# Patient Record
Sex: MALE | Race: WHITE | URBAN - METROPOLITAN AREA
[De-identification: names, ages, dates, MRNs, and addresses within clinical notes are randomized per-mention and may not be internally consistent; named-entity substitution may affect disease eponyms.]

---

## 2017-08-09 ENCOUNTER — OFFICE (OUTPATIENT)
Dept: URBAN - METROPOLITAN AREA CLINIC 33 | Facility: CLINIC | Age: 62
End: 2017-08-09

## 2017-08-09 PROCEDURE — 00031: CPT

## 2017-08-22 ENCOUNTER — OFFICE (OUTPATIENT)
Dept: URBAN - METROPOLITAN AREA CLINIC 32 | Facility: CLINIC | Age: 62
End: 2017-08-22
Payer: COMMERCIAL

## 2017-08-22 VITALS
OXYGEN SATURATION: 93 % | SYSTOLIC BLOOD PRESSURE: 121 MMHG | TEMPERATURE: 98.1 F | DIASTOLIC BLOOD PRESSURE: 86 MMHG | SYSTOLIC BLOOD PRESSURE: 127 MMHG | RESPIRATION RATE: 12 BRPM | HEART RATE: 70 BPM | DIASTOLIC BLOOD PRESSURE: 87 MMHG | HEART RATE: 65 BPM | TEMPERATURE: 97.5 F | OXYGEN SATURATION: 94 % | RESPIRATION RATE: 14 BRPM | HEIGHT: 67 IN | SYSTOLIC BLOOD PRESSURE: 176 MMHG | WEIGHT: 230 LBS | DIASTOLIC BLOOD PRESSURE: 96 MMHG

## 2017-08-22 DIAGNOSIS — K63.5 POLYP OF COLON: ICD-10-CM

## 2017-08-22 DIAGNOSIS — Z12.11 ENCOUNTER FOR SCREENING FOR MALIGNANT NEOPLASM OF COLON: ICD-10-CM

## 2017-08-22 DIAGNOSIS — D12.3 BENIGN NEOPLASM OF TRANSVERSE COLON: ICD-10-CM

## 2017-08-22 PROBLEM — D12.4 BENIGN NEOPLASM OF DESCENDING COLON: Status: ACTIVE | Noted: 2017-08-22

## 2017-08-22 PROBLEM — K64.0 FIRST DEGREE HEMORRHOIDS: Status: ACTIVE | Noted: 2017-08-22

## 2017-08-22 PROCEDURE — 00810: CPT | Mod: QS,AA,P3

## 2017-08-22 PROCEDURE — 45380 COLONOSCOPY AND BIOPSY: CPT | Mod: 59

## 2017-08-22 PROCEDURE — 45385 COLONOSCOPY W/LESION REMOVAL: CPT

## 2017-08-22 PROCEDURE — 00810: CPT | Mod: AA,P3,QS

## 2021-08-29 ENCOUNTER — APPOINTMENT (OUTPATIENT)
Dept: CT IMAGING | Age: 66
DRG: 378 | End: 2021-08-29
Attending: EMERGENCY MEDICINE
Payer: MEDICARE

## 2021-08-29 ENCOUNTER — HOSPITAL ENCOUNTER (INPATIENT)
Age: 66
LOS: 3 days | Discharge: HOME | DRG: 378 | End: 2021-09-01
Attending: EMERGENCY MEDICINE | Admitting: INTERNAL MEDICINE
Payer: MEDICARE

## 2021-08-29 DIAGNOSIS — K92.2 GASTROINTESTINAL HEMORRHAGE, UNSPECIFIED GASTROINTESTINAL HEMORRHAGE TYPE: Primary | ICD-10-CM

## 2021-08-29 PROBLEM — I10 HTN (HYPERTENSION): Status: ACTIVE | Noted: 2021-08-29

## 2021-08-29 PROBLEM — E78.5 HYPERLIPIDEMIA: Status: ACTIVE | Noted: 2021-08-29

## 2021-08-29 PROBLEM — D72.829 LEUKOCYTOSIS: Status: ACTIVE | Noted: 2021-08-29

## 2021-08-29 PROBLEM — E87.5 HYPERKALEMIA: Status: ACTIVE | Noted: 2021-08-29

## 2021-08-29 PROBLEM — I25.10 CAD (CORONARY ARTERY DISEASE): Status: ACTIVE | Noted: 2021-08-29

## 2021-08-29 LAB
ALBUMIN SERPL-MCNC: 3.2 G/DL (ref 3.5–5)
ALBUMIN/GLOB SERPL: 1.1 {RATIO} (ref 1.1–2.2)
ALP SERPL-CCNC: 55 U/L (ref 45–117)
ALT SERPL-CCNC: 21 U/L (ref 12–78)
ANION GAP SERPL CALC-SCNC: 6 MMOL/L (ref 5–15)
AST SERPL-CCNC: 32 U/L (ref 15–37)
BASOPHILS # BLD: 0.1 K/UL (ref 0–0.1)
BASOPHILS NFR BLD: 1 % (ref 0–1)
BILIRUB SERPL-MCNC: 0.7 MG/DL (ref 0.2–1)
BUN SERPL-MCNC: 33 MG/DL (ref 6–20)
BUN/CREAT SERPL: 28 (ref 12–20)
CALCIUM SERPL-MCNC: 7.8 MG/DL (ref 8.5–10.1)
CHLORIDE SERPL-SCNC: 111 MMOL/L (ref 97–108)
CO2 SERPL-SCNC: 23 MMOL/L (ref 21–32)
CREAT SERPL-MCNC: 1.17 MG/DL (ref 0.7–1.3)
DIFFERENTIAL METHOD BLD: ABNORMAL
EOSINOPHIL # BLD: 0.7 K/UL (ref 0–0.4)
EOSINOPHIL NFR BLD: 4 % (ref 0–7)
ERYTHROCYTE [DISTWIDTH] IN BLOOD BY AUTOMATED COUNT: 13.5 % (ref 11.5–14.5)
GLOBULIN SER CALC-MCNC: 2.8 G/DL (ref 2–4)
GLUCOSE SERPL-MCNC: 143 MG/DL (ref 65–100)
HCT VFR BLD AUTO: 40.2 % (ref 36.6–50.3)
HGB BLD-MCNC: 13.5 G/DL (ref 12.1–17)
IMM GRANULOCYTES # BLD AUTO: 0.1 K/UL (ref 0–0.04)
IMM GRANULOCYTES NFR BLD AUTO: 1 % (ref 0–0.5)
INR PPP: 1.1 (ref 0.9–1.1)
LYMPHOCYTES # BLD: 3.3 K/UL (ref 0.8–3.5)
LYMPHOCYTES NFR BLD: 19 % (ref 12–49)
MCH RBC QN AUTO: 31.2 PG (ref 26–34)
MCHC RBC AUTO-ENTMCNC: 33.6 G/DL (ref 30–36.5)
MCV RBC AUTO: 92.8 FL (ref 80–99)
MONOCYTES # BLD: 1.1 K/UL (ref 0–1)
MONOCYTES NFR BLD: 7 % (ref 5–13)
NEUTS SEG # BLD: 11.7 K/UL (ref 1.8–8)
NEUTS SEG NFR BLD: 68 % (ref 32–75)
NRBC # BLD: 0 K/UL (ref 0–0.01)
NRBC BLD-RTO: 0 PER 100 WBC
PLATELET # BLD AUTO: 303 K/UL (ref 150–400)
PMV BLD AUTO: 11.3 FL (ref 8.9–12.9)
POTASSIUM SERPL-SCNC: 5.5 MMOL/L (ref 3.5–5.1)
PROT SERPL-MCNC: 6 G/DL (ref 6.4–8.2)
PROTHROMBIN TIME: 11.7 SEC (ref 9–11.1)
RBC # BLD AUTO: 4.33 M/UL (ref 4.1–5.7)
SODIUM SERPL-SCNC: 140 MMOL/L (ref 136–145)
WBC # BLD AUTO: 17 K/UL (ref 4.1–11.1)

## 2021-08-29 PROCEDURE — 65660000000 HC RM CCU STEPDOWN

## 2021-08-29 PROCEDURE — 86920 COMPATIBILITY TEST SPIN: CPT

## 2021-08-29 PROCEDURE — 99285 EMERGENCY DEPT VISIT HI MDM: CPT

## 2021-08-29 PROCEDURE — 86923 COMPATIBILITY TEST ELECTRIC: CPT

## 2021-08-29 PROCEDURE — 86901 BLOOD TYPING SEROLOGIC RH(D): CPT

## 2021-08-29 PROCEDURE — 36415 COLL VENOUS BLD VENIPUNCTURE: CPT

## 2021-08-29 PROCEDURE — P9016 RBC LEUKOCYTES REDUCED: HCPCS

## 2021-08-29 PROCEDURE — 85025 COMPLETE CBC W/AUTO DIFF WBC: CPT

## 2021-08-29 PROCEDURE — 96365 THER/PROPH/DIAG IV INF INIT: CPT

## 2021-08-29 PROCEDURE — 30233N1 TRANSFUSION OF NONAUTOLOGOUS RED BLOOD CELLS INTO PERIPHERAL VEIN, PERCUTANEOUS APPROACH: ICD-10-PCS | Performed by: EMERGENCY MEDICINE

## 2021-08-29 PROCEDURE — 74011000636 HC RX REV CODE- 636: Performed by: EMERGENCY MEDICINE

## 2021-08-29 PROCEDURE — 74011250636 HC RX REV CODE- 250/636: Performed by: EMERGENCY MEDICINE

## 2021-08-29 PROCEDURE — 74011000258 HC RX REV CODE- 258: Performed by: EMERGENCY MEDICINE

## 2021-08-29 PROCEDURE — 36430 TRANSFUSION BLD/BLD COMPNT: CPT

## 2021-08-29 PROCEDURE — 74011000250 HC RX REV CODE- 250: Performed by: EMERGENCY MEDICINE

## 2021-08-29 PROCEDURE — 93005 ELECTROCARDIOGRAM TRACING: CPT

## 2021-08-29 PROCEDURE — 30233K1 TRANSFUSION OF NONAUTOLOGOUS FROZEN PLASMA INTO PERIPHERAL VEIN, PERCUTANEOUS APPROACH: ICD-10-PCS | Performed by: EMERGENCY MEDICINE

## 2021-08-29 PROCEDURE — P9059 PLASMA, FRZ BETWEEN 8-24HOUR: HCPCS

## 2021-08-29 PROCEDURE — 80053 COMPREHEN METABOLIC PANEL: CPT

## 2021-08-29 PROCEDURE — 74177 CT ABD & PELVIS W/CONTRAST: CPT

## 2021-08-29 PROCEDURE — 85610 PROTHROMBIN TIME: CPT

## 2021-08-29 RX ORDER — ONDANSETRON 2 MG/ML
4 INJECTION INTRAMUSCULAR; INTRAVENOUS
Status: COMPLETED | OUTPATIENT
Start: 2021-08-29 | End: 2021-08-29

## 2021-08-29 RX ORDER — SODIUM CHLORIDE 9 MG/ML
250 INJECTION, SOLUTION INTRAVENOUS AS NEEDED
Status: DISCONTINUED | OUTPATIENT
Start: 2021-08-29 | End: 2021-09-01 | Stop reason: HOSPADM

## 2021-08-29 RX ORDER — TRANEXAMIC ACID 100 MG/ML
1 INJECTION, SOLUTION INTRAVENOUS ONCE
Status: DISCONTINUED | OUTPATIENT
Start: 2021-08-29 | End: 2021-08-29

## 2021-08-29 RX ADMIN — ONDANSETRON 4 MG: 2 INJECTION INTRAMUSCULAR; INTRAVENOUS at 23:37

## 2021-08-29 RX ADMIN — IOPAMIDOL 100 ML: 755 INJECTION, SOLUTION INTRAVENOUS at 22:41

## 2021-08-29 RX ADMIN — TRANEXAMIC ACID 1000 MG: 1 INJECTION, SOLUTION INTRAVENOUS at 22:11

## 2021-08-30 LAB
ANION GAP SERPL CALC-SCNC: 4 MMOL/L (ref 5–15)
BASOPHILS # BLD: 0 K/UL (ref 0–0.1)
BASOPHILS NFR BLD: 0 % (ref 0–1)
BLD PROD TYP BPU: NORMAL
BLD PROD TYP BPU: NORMAL
BPU ID: NORMAL
BPU ID: NORMAL
BUN SERPL-MCNC: 34 MG/DL (ref 6–20)
BUN/CREAT SERPL: 45 (ref 12–20)
CALCIUM SERPL-MCNC: 7.7 MG/DL (ref 8.5–10.1)
CHLORIDE SERPL-SCNC: 114 MMOL/L (ref 97–108)
CO2 SERPL-SCNC: 24 MMOL/L (ref 21–32)
COVID-19 RAPID TEST, COVR: NOT DETECTED
CREAT SERPL-MCNC: 0.76 MG/DL (ref 0.7–1.3)
DIFFERENTIAL METHOD BLD: ABNORMAL
EOSINOPHIL # BLD: 0 K/UL (ref 0–0.4)
EOSINOPHIL NFR BLD: 0 % (ref 0–7)
ERYTHROCYTE [DISTWIDTH] IN BLOOD BY AUTOMATED COUNT: 13.6 % (ref 11.5–14.5)
GLUCOSE SERPL-MCNC: 104 MG/DL (ref 65–100)
HCT VFR BLD AUTO: 36 % (ref 36.6–50.3)
HCT VFR BLD AUTO: 36.1 % (ref 36.6–50.3)
HCT VFR BLD AUTO: 38.3 % (ref 36.6–50.3)
HGB BLD-MCNC: 12.2 G/DL (ref 12.1–17)
HGB BLD-MCNC: 12.5 G/DL (ref 12.1–17)
HGB BLD-MCNC: 13.1 G/DL (ref 12.1–17)
IMM GRANULOCYTES # BLD AUTO: 0 K/UL
IMM GRANULOCYTES NFR BLD AUTO: 0 %
LYMPHOCYTES # BLD: 0.7 K/UL (ref 0.8–3.5)
LYMPHOCYTES NFR BLD: 4 % (ref 12–49)
MCH RBC QN AUTO: 30.8 PG (ref 26–34)
MCHC RBC AUTO-ENTMCNC: 34.2 G/DL (ref 30–36.5)
MCV RBC AUTO: 89.9 FL (ref 80–99)
MONOCYTES # BLD: 0 K/UL (ref 0–1)
MONOCYTES NFR BLD: 0 % (ref 5–13)
MYELOCYTES NFR BLD MANUAL: 1 %
NEUTS BAND NFR BLD MANUAL: 3 % (ref 0–6)
NEUTS SEG # BLD: 15.9 K/UL (ref 1.8–8)
NEUTS SEG NFR BLD: 92 % (ref 32–75)
NRBC # BLD: 0 K/UL (ref 0–0.01)
NRBC BLD-RTO: 0 PER 100 WBC
PLATELET # BLD AUTO: 211 K/UL (ref 150–400)
PMV BLD AUTO: 11.4 FL (ref 8.9–12.9)
POTASSIUM SERPL-SCNC: 5.6 MMOL/L (ref 3.5–5.1)
RBC # BLD AUTO: 4.26 M/UL (ref 4.1–5.7)
RBC MORPH BLD: ABNORMAL
SODIUM SERPL-SCNC: 142 MMOL/L (ref 136–145)
SOURCE, COVRS: NORMAL
STATUS OF UNIT,%ST: NORMAL
STATUS OF UNIT,%ST: NORMAL
UNIT DIVISION, %UDIV: 0
UNIT DIVISION, %UDIV: 0
WBC # BLD AUTO: 16.7 K/UL (ref 4.1–11.1)

## 2021-08-30 PROCEDURE — 36415 COLL VENOUS BLD VENIPUNCTURE: CPT

## 2021-08-30 PROCEDURE — 74011250637 HC RX REV CODE- 250/637: Performed by: INTERNAL MEDICINE

## 2021-08-30 PROCEDURE — 65660000000 HC RM CCU STEPDOWN

## 2021-08-30 PROCEDURE — 85025 COMPLETE CBC W/AUTO DIFF WBC: CPT

## 2021-08-30 PROCEDURE — 74011250636 HC RX REV CODE- 250/636: Performed by: INTERNAL MEDICINE

## 2021-08-30 PROCEDURE — 85014 HEMATOCRIT: CPT

## 2021-08-30 PROCEDURE — C9113 INJ PANTOPRAZOLE SODIUM, VIA: HCPCS | Performed by: EMERGENCY MEDICINE

## 2021-08-30 PROCEDURE — 80048 BASIC METABOLIC PNL TOTAL CA: CPT

## 2021-08-30 PROCEDURE — 74011250636 HC RX REV CODE- 250/636: Performed by: EMERGENCY MEDICINE

## 2021-08-30 PROCEDURE — 87635 SARS-COV-2 COVID-19 AMP PRB: CPT

## 2021-08-30 PROCEDURE — 74011000250 HC RX REV CODE- 250: Performed by: INTERNAL MEDICINE

## 2021-08-30 RX ORDER — GLUCOSAM/CHONDRO/HERB 149/HYAL 750-100 MG
1 TABLET ORAL DAILY
COMMUNITY

## 2021-08-30 RX ORDER — ASPIRIN 81 MG/1
81 TABLET ORAL DAILY
COMMUNITY
End: 2021-09-01

## 2021-08-30 RX ORDER — CALCIUM CARBONATE 500(1250)
1 TABLET ORAL
COMMUNITY

## 2021-08-30 RX ORDER — BISMUTH SUBSALICYLATE 262 MG
1 TABLET,CHEWABLE ORAL DAILY
COMMUNITY

## 2021-08-30 RX ORDER — SODIUM CHLORIDE 9 MG/ML
75 INJECTION, SOLUTION INTRAVENOUS CONTINUOUS
Status: DISCONTINUED | OUTPATIENT
Start: 2021-08-30 | End: 2021-09-01

## 2021-08-30 RX ORDER — SODIUM CHLORIDE 0.9 % (FLUSH) 0.9 %
5-40 SYRINGE (ML) INJECTION EVERY 8 HOURS
Status: DISCONTINUED | OUTPATIENT
Start: 2021-08-30 | End: 2021-09-01 | Stop reason: HOSPADM

## 2021-08-30 RX ORDER — SODIUM POLYSTYRENE SULFONATE 15 G/60ML
15 SUSPENSION ORAL; RECTAL
Status: COMPLETED | OUTPATIENT
Start: 2021-08-30 | End: 2021-08-30

## 2021-08-30 RX ORDER — BISACODYL 5 MG
10 TABLET, DELAYED RELEASE (ENTERIC COATED) ORAL
Status: COMPLETED | OUTPATIENT
Start: 2021-08-30 | End: 2021-08-30

## 2021-08-30 RX ORDER — SODIUM CHLORIDE 0.9 % (FLUSH) 0.9 %
5-40 SYRINGE (ML) INJECTION AS NEEDED
Status: DISCONTINUED | OUTPATIENT
Start: 2021-08-30 | End: 2021-09-01 | Stop reason: HOSPADM

## 2021-08-30 RX ORDER — ROSUVASTATIN CALCIUM 10 MG/1
10 TABLET, COATED ORAL DAILY
COMMUNITY

## 2021-08-30 RX ORDER — CHOLECALCIFEROL (VITAMIN D3) 125 MCG
2000 CAPSULE ORAL DAILY
COMMUNITY

## 2021-08-30 RX ORDER — POLYETHYLENE GLYCOL 3350, SODIUM SULFATE, SODIUM CHLORIDE, POTASSIUM CHLORIDE, SODIUM ASCORBATE, AND ASCORBIC ACID 7.5-2.691G
2 KIT ORAL ONCE
Status: COMPLETED | OUTPATIENT
Start: 2021-08-30 | End: 2021-08-30

## 2021-08-30 RX ORDER — LISINOPRIL 40 MG/1
40 TABLET ORAL DAILY
COMMUNITY

## 2021-08-30 RX ADMIN — SODIUM CHLORIDE 125 ML/HR: 9 INJECTION, SOLUTION INTRAVENOUS at 01:22

## 2021-08-30 RX ADMIN — Medication 10 ML: at 20:10

## 2021-08-30 RX ADMIN — POLYETHYLENE GLYCOL 3350, SODIUM SULFATE, SODIUM CHLORIDE, POTASSIUM CHLORIDE, ASCORBIC ACID, SODIUM ASCORBATE 2 L: KIT at 15:24

## 2021-08-30 RX ADMIN — PANTOPRAZOLE SODIUM 40 MG: 40 INJECTION, POWDER, FOR SOLUTION INTRAVENOUS at 00:32

## 2021-08-30 RX ADMIN — BISACODYL 10 MG: 5 TABLET, COATED ORAL at 15:22

## 2021-08-30 RX ADMIN — BISACODYL 10 MG: 5 TABLET, COATED ORAL at 20:09

## 2021-08-30 RX ADMIN — BISACODYL 10 MG: 5 TABLET, COATED ORAL at 18:33

## 2021-08-30 RX ADMIN — SODIUM POLYSTYRENE SULFONATE 15 G: 15 SUSPENSION ORAL; RECTAL at 05:42

## 2021-08-30 RX ADMIN — Medication 10 ML: at 01:22

## 2021-08-30 RX ADMIN — Medication 10 ML: at 15:24

## 2021-08-30 RX ADMIN — BISACODYL 10 MG: 5 TABLET, COATED ORAL at 17:32

## 2021-08-30 NOTE — PROGRESS NOTES
History briefly reviewed with Dr. Candi Couch. Agree with large bore vascular access and aggressive colloid resuscitation. When reasonably stable, CTA for source. Then will need to consider IR vs GI for source control. Would not tolerate general anesthetic for surgery at this juncture, given instability. Following.

## 2021-08-30 NOTE — ED NOTES
Second blood transfusion started simultaneously with blood bolus at 125ml/hr per verbal order from Dr. Mau Kimble.

## 2021-08-30 NOTE — ADVANCED PRACTICE NURSE
Surgery History and Physical    Subjective:      Kathy Cortes is a 78 y.o. male with medical hx sig for CAD, HTN, hyperlipidemia, who presents with 1 day hx of BRB with stooling, weakness, significant hypotension on arrival (systolics into 90G). Pt denies pain. No fever, chills, weight loss. No n/v. No known hx of hemorrhoids. Occasional abd pain, no known source. Hx of open Gregg-en-Y in 2005 so frequent loose stools normal.  Last colonoscopy approx 4 years ago and no known polyps or diverticulosis. 10 year follow up. No fam hx of colon cancer or polyps known. Pertinent labs- WBC 17, Hgb 13.5, plts 303, K 5.5, Cr 1.17, LFTs WNL. Given 2u PRBC and 1FFP on arrival for sig hypotension. Stable currently. On aspirin- no other anticoags. No past medical history on file. No past surgical history on file. No family history on file. Social History     Socioeconomic History    Marital status:      Spouse name: Not on file    Number of children: Not on file    Years of education: Not on file    Highest education level: Not on file     Social Determinants of Health     Financial Resource Strain:     Difficulty of Paying Living Expenses:    Food Insecurity:     Worried About Running Out of Food in the Last Year:     [address] in the Last Year:    Transportation Needs:     Lack of Transportation (Medical):      Lack of Transportation (Non-Medical):    Physical Activity:     Days of Exercise per Week:     Minutes of Exercise per Session:    Stress:     Feeling of Stress :    Social Connections:     Frequency of Communication with Friends and Family:     Frequency of Social Gatherings with Friends and Family:     Attends Zoroastrianism Services:     Active Member of Clubs or Organizations:     Attends Club or Organization Meetings:     Marital Status:       Current Facility-Administered Medications   Medication Dose Route Frequency    sodium chloride (NS) flush 5-40 mL  5-40 mL IntraVENous Q8H    sodium chloride (NS) flush 5-40 mL  5-40 mL IntraVENous PRN    0.9% sodium chloride infusion  125 mL/hr IntraVENous CONTINUOUS    bisacodyL (DULCOLAX) tablet 10 mg  10 mg Oral Q2H    peg 3350-Electrolytes-Vit C (MOVIPREP) oral solution 2 L  2 L Oral ONCE    0.9% sodium chloride infusion 250 mL  250 mL IntraVENous PRN    0.9% sodium chloride infusion 250 mL  250 mL IntraVENous PRN     Current Outpatient Medications   Medication Sig    lisinopriL (PRINIVIL, ZESTRIL) 40 mg tablet Take 40 mg by mouth daily.  rosuvastatin (CRESTOR) 10 mg tablet Take 10 mg by mouth daily.  aspirin delayed-release 81 mg tablet Take 81 mg by mouth daily.  multivitamin (ONE A DAY) tablet Take 1 Tablet by mouth daily.  omega 3-DHA-EPA-fish oil 1,000 mg (120 mg-180 mg) capsule Take 1 Capsule by mouth daily.  cholecalciferol, vitamin D3, (Vitamin D3) 50 mcg (2,000 unit) tab Take 2,000 Units by mouth daily.  calcium carbonate (OS-DAYSI) 500 mg calcium (1,250 mg) tablet Take 1 Tablet by mouth daily as needed for Indigestion.       No Known Allergies    Review of Systems:REVIEW OF SYSTEMS:     []     Unable to obtain  ROS due to  []    mental status change  []    sedated   []    intubated   [x]    Total of 12 systems reviewed as follows:    Constitutional: neg for fevers, chills, weight loss, malaise  Eyes: negative for blurry vision or double vision  Ears, nose, mouth, throat, and face: negative for sore throat, negative for lip swelling  Respiratory: negative for SOB and cough  Cardiovascular: negative for CP, negative for palpitations  Gastrointestinal: positive for diarrhea/hematochezia, negative for nausea, vomiting, abdominal pain, constipation, melena  Genitourinary: negative for dysuria  Integument/breast: negative for skin rash  Hematologic/lymphatic: negative for bruising  Musculoskeletal: negative for muscle aches  Neurological:  dizziness    Objective:        Patient Vitals for the past 8 hrs: BP Pulse Resp SpO2   21 1200 131/76 68 14 100 %   21 1000 (!) 115/52 66 17 97 %   21 0730 123/81 68 18 97 %   21 0717  75 15 97 %   21 0716 (!) 144/83 79 19    21 0700 115/76 73 19 97 %       Temp (24hrs), Av.4 °F (36.9 °C), Min:97.6 °F (36.4 °C), Max:99.2 °F (37.3 °C)      Physical Exam:  General:  Alert, cooperative, no distress, appears stated age. Eyes:  Conjunctivae/corneas clear. Nose: Nares normal. Septum midline   Mouth/Throat: Lips, mucosa, and tongue normal.    Neck: Supple, symmetrical, trachea midline   Lungs:   Clear to auscultation bilaterally. Heart:  Regular rate and rhythm   Abdomen:   Soft, non-tender, non-distended, no rebound, no guarding, normal bowel sounds, old midline incision appreciated from Gregg-en- Y. Rectal exam deferred   Extremities: Extremities normal, atraumatic, no cyanosis or edema. Skin: Skin color, texture, turgor normal. No rashes or lesions   Neuro: Alert, oriented, speech clear     Labs:   Recent Labs     21  1232 21  0033 21  0033   WBC  --   --  16.7*   HGB 12.5   < > 13.1   HCT 36.1*   < > 38.3   PLT  --   --  211    < > = values in this interval not displayed. Recent Labs     21  0033 21  2136 21  2136      < > 140   K 5.6*   < > 5.5*   *   < > 111*   CO2 24   < > 23   *   < > 143*   BUN 34*   < > 33*   CREA 0.76   < > 1.17   CA 7.7*   < > 7.8*   ALB  --   --  3.2*   TBILI  --   --  0.7   ALT  --   --  21    < > = values in this interval not displayed. Recent Labs     21  2141   INR 1.1         Assessment and Plan:     Pt currently stable. No longer symptomatic Bps. Colonoscopy scheduled tomorrow   Will await results  If not revealing of any bleeding or source of bleeding, may need subsequent scans to eval/treat. Pt seen at bedside with dr Mannie Cox. Discussed. Her note to follow.     Signed By: Brandee Arreola NP     2021

## 2021-08-30 NOTE — ED NOTES
Explanation of wait. Pt informed he will start prepping for colonoscopy. Wife at bedside. Pt declined anymore clear liquids other than water. covid swab sent.

## 2021-08-30 NOTE — ED NOTES
Bedside and Verbal shift change report given to Andrew Matta RN (oncoming nurse) by Issa Mcelroy RN (offgoing nurse). Report included the following information SBAR, ED Summary, Intake/Output, MAR and Recent Results.

## 2021-08-30 NOTE — ED PROVIDER NOTES
53-year-old gentleman with history of Gregg-en-Y in 2005 on aspirin for coronary disease presents to the emergency department by EMS for chief complaint of several episodes of red blood per rectum. EMS reports normal blood pressures until just prior to arrival in his systolic dropped to the 24R. The history is provided by the patient, medical records and the EMS personnel. Rectal Bleeding   This is a new problem. Stool description: Bloody. Associated symptoms include nausea. Pertinent negatives include no abdominal pain, no dysuria, no fever, no vomiting and no diarrhea. No past medical history on file. No past surgical history on file. No family history on file. Social History     Socioeconomic History    Marital status:      Spouse name: Not on file    Number of children: Not on file    Years of education: Not on file    Highest education level: Not on file   Occupational History    Not on file   Tobacco Use    Smoking status: Not on file   Substance and Sexual Activity    Alcohol use: Not on file    Drug use: Not on file    Sexual activity: Not on file   Other Topics Concern    Not on file   Social History Narrative    Not on file     Social Determinants of Health     Financial Resource Strain:     Difficulty of Paying Living Expenses:    Food Insecurity:     Worried About Running Out of Food in the Last Year:     [address] in the Last Year:    Transportation Needs:     Lack of Transportation (Medical):      Lack of Transportation (Non-Medical):    Physical Activity:     Days of Exercise per Week:     Minutes of Exercise per Session:    Stress:     Feeling of Stress :    Social Connections:     Frequency of Communication with Friends and Family:     Frequency of Social Gatherings with Friends and Family:     Attends Zoroastrianism Services:     Active Member of Clubs or Organizations:     Attends Club or Organization Meetings:     Marital Status:    Intimate Partner Violence:     Fear of Current or Ex-Partner:     Emotionally Abused:     Physically Abused:     Sexually Abused: ALLERGIES: Patient has no known allergies. Review of Systems   Constitutional: Positive for fatigue. Negative for fever. HENT: Negative for sneezing and sore throat. Respiratory: Negative for cough and shortness of breath. Cardiovascular: Negative for chest pain and leg swelling. Gastrointestinal: Positive for anal bleeding and nausea. Negative for abdominal pain, diarrhea and vomiting. Genitourinary: Negative for difficulty urinating and dysuria. Musculoskeletal: Negative for arthralgias and myalgias. Skin: Negative for color change and rash. Neurological: Negative for weakness and headaches. Psychiatric/Behavioral: Negative for agitation and behavioral problems. Vitals:    08/29/21 2117 08/29/21 2130   BP: 97/75    Pulse: 81    Resp: 16    Temp: 97.6 °F (36.4 °C)    SpO2: 99% 99%   Weight: 92.1 kg (203 lb)    Height: 5' 7\" (1.702 m)             Physical Exam  Vitals and nursing note reviewed. Constitutional:       General: He is in acute distress. Appearance: He is well-developed. He is ill-appearing. He is not toxic-appearing or diaphoretic. HENT:      Head: Normocephalic and atraumatic. Nose: Nose normal.      Mouth/Throat:      Mouth: Mucous membranes are moist.      Pharynx: Oropharynx is clear. Eyes:      Extraocular Movements: Extraocular movements intact. Conjunctiva/sclera: Conjunctivae normal.      Pupils: Pupils are equal, round, and reactive to light. Cardiovascular:      Rate and Rhythm: Normal rate and regular rhythm. Pulses: Normal pulses. Heart sounds: No murmur heard. Pulmonary:      Effort: Pulmonary effort is normal. No respiratory distress. Breath sounds: Normal breath sounds. No wheezing. Chest:      Chest wall: No mass or tenderness. Abdominal:      General: There is no distension. Palpations: Abdomen is soft. Tenderness: There is no abdominal tenderness. There is no guarding or rebound. Genitourinary:     Comments: Gross bleeding at rectum  Musculoskeletal:         General: No swelling, tenderness, deformity or signs of injury. Normal range of motion. Cervical back: Normal range of motion and neck supple. No rigidity. No muscular tenderness. Right lower leg: No tenderness. No edema. Left lower leg: No tenderness. No edema. Skin:     General: Skin is warm and dry. Capillary Refill: Capillary refill takes less than 2 seconds. Coloration: Skin is pale. Neurological:      General: No focal deficit present. Mental Status: He is alert and oriented to person, place, and time. Psychiatric:         Mood and Affect: Mood normal.         Behavior: Behavior normal.          MDM  Number of Diagnoses or Management Options  Diagnosis management comments: 19-year-old male presents as above with active GI bleeding. He was stabilized with blood products in the emergency department anti-Xa. CT scan does not demonstrate any active extravasation which would be amenable to IR. General surgery and GI were consulted from the ED. Plan to admit to the hospitalist for further management. Amount and/or Complexity of Data Reviewed  Clinical lab tests: reviewed  Tests in the radiology section of CPT®: reviewed    Risk of Complications, Morbidity, and/or Mortality  General comments: 10:25 PM  I have spent 110 minutes of critical care time involved in lab review, consultations with specialist, family decision-making, and documentation. During this entire length of time I was immediately available to the patient. Critical Care:   The reason for providing this level of medical care for this critically ill patient was due a critical illness that impaired one or more vital organ systems such that there was a high probability of imminent or life threatening deterioration in the patients condition. This care involved high complexity decision making to assess, manipulate, and support vital system functions, to treat this degreee vital organ system failure and to prevent further life threatening deterioration of the patient's condition. Procedures      Perfect Serve Consult for Admission  11:11 PM    ED Room Number: KT52/68  Patient Name and age:  Carole Sibley 78 y.o.  male  Working Diagnosis:   1. Gastrointestinal hemorrhage, unspecified gastrointestinal hemorrhage type        COVID-19 Suspicion:  no  Sepsis present:  no  Reassessment needed: N/A  Code Status:  Full Code  Readmission: no  Isolation Requirements:  no  Recommended Level of Care:  step down  Department:[facility] ED - [phone]  Other: Actively bleeding on arrival.  CT scan without active extravasation. Received several units of blood/FFP in ED. General surgery and GI consulted.

## 2021-08-30 NOTE — PROGRESS NOTES
Bedside and Verbal shift change report given to Gabby Garcia RN    (oncoming nurse) by Deena Nugent (offgoing nurse). Report included the following information SBAR, ED Summary, MAR and Recent Results.

## 2021-08-30 NOTE — ED TRIAGE NOTES
Patient arrived from home via EMS. Reports bloody stool x 5 today. Patient reports nausea, generalized weakness. Patient appears pale and diaphoretic in ED.

## 2021-08-30 NOTE — H&P
[facility]  [address], [address], [place] [address]  [phone]    Admission History and Physical      NAME:  Zulay Caldwell   :   1955   MRN:  [id number]     PCP:  Jalil Grossman DO     Date of service:  2021         Subjective:     CHIEF COMPLAINT: Rectal bleeding. HISTORY OF PRESENT ILLNESS:     Mr. Julisa Sandra is a 78 y.o.  male who is admitted with hematochezia. Mr. Julisa Sandra with past medical history of CAD, hypertension, hyperlipidemia presented to ER complaining of rectal bleeding which started today. Has about 6 times rectal bleeding with bright red blood and patient  became weak. On arrival his blood pressure was low and started blood transfusion and FFP. Patient received 2 units of packed RBC and 1 unit of FFP. Patient denies rectal pain or abdominal pain, rectal bleeding is painless. Never had similar symptoms in the past.  Had normal colonoscopy about 4 years ago at [facility]. No past medical history on file. No past surgical history on file. Social History     Tobacco Use    Smoking status: Not on file   Substance Use Topics    Alcohol use: Not on file        No family history on file.      No Known Allergies     Prior to Admission medications    Not on File         Review of Systems:  (bold if positive, if negative)    Gen:  Eyes:  ENT:  CVS:  Pulm:  GI:   rectal bleedingGU:  MS:  Skin:  Psych:  Endo:  Hem:  Renal:  Neuro:            Objective:      VITALS:    Vital signs reviewed; most recent are:    Visit Vitals  BP (!) 112/58 (BP 1 Location: Right arm, BP Patient Position: At rest)   Pulse 75   Temp 98.4 °F (36.9 °C)   Resp 14   Ht 5' 7\" (1.702 m)   Wt 92.1 kg (203 lb)   SpO2 97%   BMI 31.79 kg/m²     SpO2 Readings from Last 6 Encounters:   21 97%            Intake/Output Summary (Last 24 hours) at 2021 2338  Last data filed at 2021 2307  Gross per 24 hour   Intake 648.4 ml   Output    Net 648.4 ml Exam:     Physical Exam:    Gen:  Well-developed, well-nourished, in no acute distress  HEENT:  Pink conjunctivae, PERRL, hearing intact to voice, moist mucous membranes  Neck:  Supple, without masses, thyroid non-tender  Resp:  No accessory muscle use, clear breath sounds without wheezes rales or rhonchi  Card:  No murmurs, normal S1, S2 without thrills, bruits or peripheral edema  Abd:  Soft, non-tender, non-distended, normoactive bowel sounds are present, no palpable organomegaly and no detectable hernias  Lymph:  No cervical or inguinal adenopathy  Musc:  No cyanosis or clubbing  Skin:  No rashes or ulcers, skin turgor is good  Neuro:  Cranial nerves are grossly intact, no focal motor weakness, follows commands appropriately  Psych:  Good insight, oriented to person, place and time, alert       Labs:    Recent Labs     08/29/21 2136   WBC 17.0*   HGB 13.5   HCT 40.2        Recent Labs     08/29/21 2136      K 5.5*   *   CO2 23   *   BUN 33*   CREA 1.17   CA 7.8*   ALB 3.2*   TBILI 0.7   ALT 21     No results found for: GLUCPOC  No results for input(s): PH, PCO2, PO2, HCO3, FIO2 in the last 72 hours. Recent Labs     08/29/21 2141   INR 1.1       Telemetry reviewed:         Assessment/Plan:    1. Hematochezia. Admit to stepdown. CT of the abdomen is unremarkable. Patient was transfused 2 units of packed RBC and 1 FFP. Keep n.p.o. Check H&H every 8 hours. His initial hemoglobin on arrival is normal and I am sure this will drop with subsequent blood check. GI and surgery was consulted in ER. 2.  Hyperkalemia (8/29/2021). Hold lisinopril and monitor blood chemistry in a.m. kayexalate     3. Leukocytosis (8/29/2021). Doubt infection. Likely secondary to stress due to his GI bleed. Monitor    4. CAD (coronary artery disease) (8/29/2021). Hold aspirin    5. HTN (hypertension) (8/29/2021). Hold all BP meds medicine due to hypotension    6.   Hyperlipidemia (8/29/2021).   Hold Crestor         Previous medical records reviewed     Risk of deterioration: high      Total time spent with patient: 78 [address] discussed with: Patient, Family, Nursing Staff and >50% of time spent in counseling and coordination of care    Discussed:  Care Plan    Prophylaxis:  SCD's    Probable Disposition:  Home w/Family           ___________________________________________________    Attending Physician: Lynette Gamez MD

## 2021-08-30 NOTE — ED NOTES
Verbal order from Dr. Aileen Severs to run blood at 300ml/hr. Patient 's BP is low, patient is symptomatic.

## 2021-08-30 NOTE — CONSULTS
Gwendolyn Mccormick, NP-C  [phone] cell     Gastroenterology Consultation Note      Admit Date: 8/29/2021  Consult Date: 8/30/2021   I greatly appreciate your asking me to see Orly Juárez, thank you very much for the opportunity to participate in his care. Narrative Assessment and Plan   77 y/o male presents with painless rectal bleeding for 1 day. Reports up to 6 BRB episodes with diarrhea. No fever or chills. No abdominal or rectal pain. No previous episodes of rectal bleeding. Last colonoscopy was 4 years ago in [place] and was reportedly normal. No family history of CRC or colon polyps. No anemia. History of RNY in 2005. · Colonoscopy tomorrow. Diet and prep ordered. · Follow H&H, transfuse prn. Subjective:     Chief Complaint: Rectal bleeding    History of Present Illness: 77 y/o male presented with 1 day history of BRBPR accompanied by diarrhea. Reports up to 6 episodes. No abdominal or rectal pain. No nausea, vomiting, weight loss. CT without acute process. History of RNY in 2005. Does not smoke or use NSAIDs. No anemia but some leukocytosis. Last colonoscopy 4 years ago in [place] and was reportedly normal. No family history of CRC or colon polyps. No anticoagulants. No family history of CRC or colon polyps. Pertinent labs: WBC 17, H&H 13.5/40.2, MCV 92.8, platelets 503, sodium 140, potassium 5.5, BUN 33, creatinine 1.17, normal LFTs. PCP:  Nisha Solomon,     No past medical history on file. No past surgical history on file. Social History     Tobacco Use    Smoking status: Not on file   Substance Use Topics    Alcohol use: Not on file        No family history on file. No Known Allergies         Home Medications:  Prior to Admission Medications   Prescriptions Last Dose Informant Patient Reported? Taking?   aspirin delayed-release 81 mg tablet 8/29/2021 at am Self Yes Yes   Sig: Take 81 mg by mouth daily.    calcium carbonate (OS-DAYSI) 500 mg calcium (1,250 mg) tablet  Self Yes Yes   Sig: Take 1 Tablet by mouth daily as needed for Indigestion. cholecalciferol, vitamin D3, (Vitamin D3) 50 mcg (2,000 unit) tab 8/29/2021 at am Self Yes Yes   Sig: Take 2,000 Units by mouth daily. lisinopriL (PRINIVIL, ZESTRIL) 40 mg tablet 8/29/2021 at am Self Yes Yes   Sig: Take 40 mg by mouth daily. multivitamin (ONE A DAY) tablet 8/29/2021 at am Self Yes Yes   Sig: Take 1 Tablet by mouth daily. omega 3-DHA-EPA-fish oil 1,000 mg (120 mg-180 mg) capsule 8/29/2021 at am Self Yes Yes   Sig: Take 1 Capsule by mouth daily. rosuvastatin (CRESTOR) 10 mg tablet 8/29/2021 at am Self Yes Yes   Sig: Take 10 mg by mouth daily. Facility-Administered Medications: None       Hospital Medications:  Current Facility-Administered Medications   Medication Dose Route Frequency    sodium chloride (NS) flush 5-40 mL  5-40 mL IntraVENous Q8H    sodium chloride (NS) flush 5-40 mL  5-40 mL IntraVENous PRN    0.9% sodium chloride infusion  125 mL/hr IntraVENous CONTINUOUS    bisacodyL (DULCOLAX) tablet 10 mg  10 mg Oral Q2H    peg 3350-Electrolytes-Vit C (MOVIPREP) oral solution 2 L  2 L Oral ONCE    0.9% sodium chloride infusion 250 mL  250 mL IntraVENous PRN    0.9% sodium chloride infusion 250 mL  250 mL IntraVENous PRN     Current Outpatient Medications   Medication Sig    lisinopriL (PRINIVIL, ZESTRIL) 40 mg tablet Take 40 mg by mouth daily.  rosuvastatin (CRESTOR) 10 mg tablet Take 10 mg by mouth daily.  aspirin delayed-release 81 mg tablet Take 81 mg by mouth daily.  multivitamin (ONE A DAY) tablet Take 1 Tablet by mouth daily.  omega 3-DHA-EPA-fish oil 1,000 mg (120 mg-180 mg) capsule Take 1 Capsule by mouth daily.  cholecalciferol, vitamin D3, (Vitamin D3) 50 mcg (2,000 unit) tab Take 2,000 Units by mouth daily.  calcium carbonate (OS-DAYSI) 500 mg calcium (1,250 mg) tablet Take 1 Tablet by mouth daily as needed for Indigestion.        Review of Systems: Per HPI, otherwise negative. Objective:     Physical Exam:  Visit Vitals  /76   Pulse 68   Temp 99.2 °F (37.3 °C)   Resp 14   Ht 5' 7\" (1.702 m)   Wt 92.1 kg (203 lb)   SpO2 100%   BMI 31.79 kg/m²     SpO2 Readings from Last 6 Encounters:   08/30/21 100%            Intake/Output Summary (Last 24 hours) at 8/30/2021 1352  Last data filed at 8/30/2021 0415  Gross per 24 hour   Intake 2054.7 ml   Output 400 ml   Net 1654.7 ml      General: no distress, comfortable  Skin:  No rash or palpable dermatologic mass lesions  HEENT: Pupils equal, sclera anicteric, oropharynx with no gross lesions  Cardiovascular: No abnormal audible heart sounds, well perfused, no edema  Respiratory:  No abnormal audible breath sounds, normal respiratory effort, no throacic deformity  GI:  Abdomen nondistended, nontender, no mass, no free fluid, no rebound or guarding. Musculoskeletal:  No skeletal deformity nor acute arthritis noted.   Neurological:  Motor and sensory function intact in upper extremeties  Psychiatric:  Normal affect, memory intact, appears to have insight into current illness    Laboratory:    Recent Results (from the past 24 hour(s))   PLASMA, ALLOCATE    Collection Time: 08/29/21  9:30 PM   Result Value Ref Range    Unit number [id number]     Blood component type FP 24h,Thaw     Unit division 00     Status of unit TRANSFUSED     Unit number [id number]     Blood component type FP 24h,Thaw     Unit division 00     Status of unit DISCARDED,INCINERATED    EMERGENT RELEASE OF UNCROSSMATCHED RED CELLS    Collection Time: 08/29/21  9:36 PM   Result Value Ref Range    Crossmatch Expiration 09/01/2021,2359     ABO/Rh(D) A NEGATIVE     Antibody screen NEG     Unit number [id number]     Blood component type  LR     Unit division 00     Status of unit TRANSFUSED     Crossmatch result Compatible     Unit number [id number]     Blood component type  LR     Unit division 00     Status of unit TRANSFUSED Crossmatch result Compatible     Unit number [id number]     Blood component type RC LR     Unit division 00     Status of unit ALLOCATED     Crossmatch result Compatible     Unit number [id number]     Blood component type RC LR,1     Unit division 00     Status of unit ALLOCATED     Crossmatch result Compatible    CBC WITH AUTOMATED DIFF    Collection Time: 08/29/21  9:36 PM   Result Value Ref Range    WBC 17.0 (H) 4.1 - 11.1 K/uL    RBC 4.33 4.10 - 5.70 M/uL    HGB 13.5 12.1 - 17.0 g/dL    HCT 40.2 36.6 - 50.3 %    MCV 92.8 80.0 - 99.0 FL    MCH 31.2 26.0 - 34.0 PG    MCHC 33.6 30.0 - 36.5 g/dL    RDW 13.5 11.5 - 14.5 %    PLATELET 997 246 - 364 K/uL    MPV 11.3 8.9 - 12.9 FL    NRBC 0.0 0  WBC    ABSOLUTE NRBC 0.00 0.00 - 0.01 K/uL    NEUTROPHILS 68 32 - 75 %    LYMPHOCYTES 19 12 - 49 %    MONOCYTES 7 5 - 13 %    EOSINOPHILS 4 0 - 7 %    BASOPHILS 1 0 - 1 %    IMMATURE GRANULOCYTES 1 (H) 0.0 - 0.5 %    ABS. NEUTROPHILS 11.7 (H) 1.8 - 8.0 K/UL    ABS. LYMPHOCYTES 3.3 0.8 - 3.5 K/UL    ABS. MONOCYTES 1.1 (H) 0.0 - 1.0 K/UL    ABS. EOSINOPHILS 0.7 (H) 0.0 - 0.4 K/UL    ABS. BASOPHILS 0.1 0.0 - 0.1 K/UL    ABS. IMM. GRANS. 0.1 (H) 0.00 - 0.04 K/UL    DF AUTOMATED     METABOLIC PANEL, COMPREHENSIVE    Collection Time: 08/29/21  9:36 PM   Result Value Ref Range    Sodium 140 136 - 145 mmol/L    Potassium 5.5 (H) 3.5 - 5.1 mmol/L    Chloride 111 (H) 97 - 108 mmol/L    CO2 23 21 - 32 mmol/L    Anion gap 6 5 - 15 mmol/L    Glucose 143 (H) 65 - 100 mg/dL    BUN 33 (H) 6 - 20 MG/DL    Creatinine 1.17 0.70 - 1.30 MG/DL    BUN/Creatinine ratio 28 (H) 12 - 20      GFR est AA >60 >60 ml/min/1.73m2    GFR est non-AA >60 >60 ml/min/1.73m2    Calcium 7.8 (L) 8.5 - 10.1 MG/DL    Bilirubin, total 0.7 0.2 - 1.0 MG/DL    ALT (SGPT) 21 12 - 78 U/L    AST (SGOT) 32 15 - 37 U/L    Alk.  phosphatase 55 45 - 117 U/L    Protein, total 6.0 (L) 6.4 - 8.2 g/dL    Albumin 3.2 (L) 3.5 - 5.0 g/dL    Globulin 2.8 2.0 - 4.0 g/dL A-G Ratio 1.1 1.1 - 2.2     PROTHROMBIN TIME + INR    Collection Time: 08/29/21  9:41 PM   Result Value Ref Range    INR 1.1 0.9 - 1.1      Prothrombin time 11.7 (H) 9.0 - 28.9 sec   METABOLIC PANEL, BASIC    Collection Time: 08/30/21 12:33 AM   Result Value Ref Range    Sodium 142 136 - 145 mmol/L    Potassium 5.6 (H) 3.5 - 5.1 mmol/L    Chloride 114 (H) 97 - 108 mmol/L    CO2 24 21 - 32 mmol/L    Anion gap 4 (L) 5 - 15 mmol/L    Glucose 104 (H) 65 - 100 mg/dL    BUN 34 (H) 6 - 20 MG/DL    Creatinine 0.76 0.70 - 1.30 MG/DL    BUN/Creatinine ratio 45 (H) 12 - 20      GFR est AA >60 >60 ml/min/1.73m2    GFR est non-AA >60 >60 ml/min/1.73m2    Calcium 7.7 (L) 8.5 - 10.1 MG/DL   CBC WITH AUTOMATED DIFF    Collection Time: 08/30/21 12:33 AM   Result Value Ref Range    WBC 16.7 (H) 4.1 - 11.1 K/uL    RBC 4.26 4.10 - 5.70 M/uL    HGB 13.1 12.1 - 17.0 g/dL    HCT 38.3 36.6 - 50.3 %    MCV 89.9 80.0 - 99.0 FL    MCH 30.8 26.0 - 34.0 PG    MCHC 34.2 30.0 - 36.5 g/dL    RDW 13.6 11.5 - 14.5 %    PLATELET 785 388 - 377 K/uL    MPV 11.4 8.9 - 12.9 FL    NRBC 0.0 0  WBC    ABSOLUTE NRBC 0.00 0.00 - 0.01 K/uL    NEUTROPHILS 92 (H) 32 - 75 %    BAND NEUTROPHILS 3 0 - 6 %    LYMPHOCYTES 4 (L) 12 - 49 %    MONOCYTES 0 (L) 5 - 13 %    EOSINOPHILS 0 0 - 7 %    BASOPHILS 0 0 - 1 %    MYELOCYTES 1 (H) 0 %    IMMATURE GRANULOCYTES 0 %    ABS. NEUTROPHILS 15.9 (H) 1.8 - 8.0 K/UL    ABS. LYMPHOCYTES 0.7 (L) 0.8 - 3.5 K/UL    ABS. MONOCYTES 0.0 0.0 - 1.0 K/UL    ABS. EOSINOPHILS 0.0 0.0 - 0.4 K/UL    ABS. BASOPHILS 0.0 0.0 - 0.1 K/UL    ABS. IMM.  GRANS. 0.0 K/UL    DF MANUAL      RBC COMMENTS NORMOCYTIC, NORMOCHROMIC     HGB & HCT    Collection Time: 08/30/21 12:32 PM   Result Value Ref Range    HGB 12.5 12.1 - 17.0 g/dL    HCT 36.1 (L) 36.6 - 50.3 %   COVID-19 RAPID TEST    Collection Time: 08/30/21 12:32 PM   Result Value Ref Range    Specimen source Nasopharyngeal      COVID-19 rapid test Not detected NOTD Assessment/Plan:     Active Problems:    GI bleed (8/29/2021)      Hyperkalemia (8/29/2021)      Leukocytosis (8/29/2021)      CAD (coronary artery disease) (8/29/2021)      HTN (hypertension) (8/29/2021)      Hyperlipidemia (8/29/2021)         See above narrative for full detail. Benita Rubio, NP    He is currently frustrated with, does not like his location in the emergency room; it is very busy, he has no privacy, spaces very small and confined. He does not wish to use a bedside commode for all of his toilet needs. Specifically advised him that there are no emergency findings that would warrant an examination today; without any examination I would not be able to provide a diagnosis. With such advice she is willing to stay till tomorrow morning to get a colonoscopy. I've discussed colonoscopy possible biopsy, polypectomy, cautery, injection, alternatives, complications including but not limited to pain, cardiopulmonary event, bleeding, perforation requiring additional blood transfusion or operative repair; all questions answered.     I have interviewed and examined patient with addendum to note above and formulation care plan to reflect my evaluation    Isabel Hickman M.D.

## 2021-08-30 NOTE — ED NOTES
Assisted pt to [facility]. Medium sized BM, dark red blood per rectum. Patient reports some dizziness with standing. Patient assisted back to bed and a position of comfort.

## 2021-08-30 NOTE — PROGRESS NOTES
[facility]: MED RECONCILIATION    Information obtained from: patient, Rx query    Significant PMH/Disease States: No past medical history on file. Chief Complaint for this Admission:   Chief Complaint   Patient presents with    Rectal Bleeding     Allergies: Patient has no known allergies. Prior to Admission Medications:     Medication Documentation Review Audit       Reviewed by Dominique Castro, KASID (Pharmacist) on 08/30/21 at 0902      Medication Sig Documenting Provider Last Dose Status Taking?   aspirin delayed-release 81 mg tablet Take 81 mg by mouth daily. Provider, Historical 8/29/2021 am Active Yes   calcium carbonate (OS-DAYSI) 500 mg calcium (1,250 mg) tablet Take 1 Tablet by mouth daily as needed for Indigestion. Provider, Historical  Active Yes           Med Note (Jahaira Cera   Mon Aug 30, 2021  9:02 AM) Only takes sometimes d/t taste   cholecalciferol, vitamin D3, (Vitamin D3) 50 mcg (2,000 unit) tab Take 2,000 Units by mouth daily. Provider, Historical 8/29/2021 am Active Yes   lisinopriL (PRINIVIL, ZESTRIL) 40 mg tablet Take 40 mg by mouth daily. Provider, Historical 8/29/2021 am Active Yes   multivitamin (ONE A DAY) tablet Take 1 Tablet by mouth daily. Provider, Historical 8/29/2021 am Active Yes   omega 3-DHA-EPA-fish oil 1,000 mg (120 mg-180 mg) capsule Take 1 Capsule by mouth daily. Provider, Historical 8/29/2021 am Active Yes   rosuvastatin (CRESTOR) 10 mg tablet Take 10 mg by mouth daily. Provider, Historical 8/29/2021 am Active Yes                Thank you,  Leandro DUONG [facility]

## 2021-08-30 NOTE — PROGRESS NOTES
Daily Progress Note: 8/30/2021  Carole Angel DO    Assessment/Plan:   Hematochezia. -CT of the abdomen is unremarkable. -Patient was transfused 2 units of packed RBC and 1 FFP in ER   -Keep n.p.o.    -Check H&H every 8 hours.    -His initial hemoglobin on arrival was normal and I am sure this will drop with subsequent blood check. -GI and surgery was consulted in ER. Hyperkalemia (8/29/2021). -Hold lisinopril and monitor blood chemistry   -kayexalate      Leukocytosis (8/29/2021). Doubt infection.    -Likely secondary to stress due to his GI bleed. -Monitor       CAD (coronary artery disease) (8/29/2021). -Hold aspirin     HTN (hypertension) (8/29/2021). -Hold all BP meds medicine due to hypotension     Hyperlipidemia (8/29/2021). -Hold Crestor               Problem List:  Problem List as of 8/30/2021 Date Reviewed: 8/29/2021        Codes Class Noted - Resolved    GI bleed ICD-10-CM: K92.2  ICD-9-CM: 578.9  8/29/2021 - Present        Hyperkalemia ICD-10-CM: E87.5  ICD-9-CM: 276.7  8/29/2021 - Present        Leukocytosis ICD-10-CM: W61.953  ICD-9-CM: 288.60  8/29/2021 - Present        CAD (coronary artery disease) ICD-10-CM: I25.10  ICD-9-CM: 414.00  8/29/2021 - Present        HTN (hypertension) ICD-10-CM: I10  ICD-9-CM: 401.9  8/29/2021 - Present        Hyperlipidemia ICD-10-CM: E78.5  ICD-9-CM: 272.4  8/29/2021 - Present              HPI:   Mr. Rodrick Gaitan is a 78 y.o.  male who is admitted with hematochezia. Mr. Rodrick Gaitan with past medical history of CAD, hypertension, hyperlipidemia presented to ER complaining of rectal bleeding which started today. Has about 6 times rectal bleeding with bright red blood and patient became weak. On arrival his blood pressure was low and started blood transfusion and FFP. Patient received 2 units of packed RBC and 1 unit of FFP. Patient denies rectal pain or abdominal pain, rectal bleeding is painless.   Never had similar symptoms in the past.  Had normal colonoscopy about 4 years ago at [facility]. (Dr Anisa Chirinos)    :  Had another bloody stool this am. Dizzy with standing. BP stable at this time. No abd pain. No NSAID use. Had gastric bypass years ago. Taking supplements. Review of Systems:   A comprehensive review of systems was negative except for that written in the HPI. Objective:   Physical Exam:     Visit Vitals  /69   Pulse 68   Temp 99.2 °F (37.3 °C)   Resp 19   Ht 5' 7\" (1.702 m)   Wt 203 lb (92.1 kg)   SpO2 97%   BMI 31.79 kg/m²      O2 Device: None (Room air)    Temp (24hrs), Av.4 °F (36.9 °C), Min:97.6 °F (36.4 °C), Max:99.2 °F (37.3 °C)     1901 -  0700  In: 4.7 [I.V.:1000]  Out: -    No intake/output data recorded. General:  Alert, cooperative, no distress, appears stated age. Head:  Normocephalic, without obvious abnormality, atraumatic. Eyes:  Conjunctivae/corneas clear. Nose: Nares normal. Septum midline. Mucosa normal. No drainage or sinus tenderness. Throat: Lips, mucosa, and tongue normal.    Neck: Supple, symmetrical, trachea midline, no adenopathy, thyroid: no enlargement/tenderness/nodules, no carotid bruit and no JVD. Back:   Symmetric, no curvature. ROM normal. No CVA tenderness. Lungs:   Clear to auscultation bilaterally. Chest wall:  No tenderness or deformity. Heart:  Regular rate and rhythm, S1, S2 normal, no murmur, click, rub or gallop. Abdomen:   Soft, non-tender. Bowel sounds normal. No masses,  No organomegaly. Extremities: Extremities normal, atraumatic, no cyanosis or edema. No calf tenderness or cords. Pulses: 2+ and symmetric all extremities. Skin: Skin color, texture, turgor normal. No rashes or lesions   Neurologic: CNII-XII intact. Alert and oriented X 3. Fine motor of hands and fingers normal.   equal.  No cogwheeling or rigidity. Gait not tested at this time. Sensation grossly normal to touch.   Gross motor of extremities normal.       Data Review:   CT Abd/Pelvis 8/29/21       FINDINGS:   LOWER THORAX: Severe coronary artery calcific atherosclerosis. LIVER: No mass. BILIARY TREE: Status post cholecystectomy. Mild CBD dilatation. SPLEEN: Calcified splenic granulomas  PANCREAS: No mass or ductal dilatation. ADRENALS: Left adrenal myelolipoma measures 3.8 cm. KIDNEYS: No mass, calculus, or hydronephrosis. STOMACH: Evidence of gastric bypass procedure. SMALL BOWEL: No dilatation or wall thickening. COLON: No dilatation or wall thickening. APPENDIX: Normal  PERITONEUM: No ascites or pneumoperitoneum. RETROPERITONEUM: No lymphadenopathy or aortic aneurysm. REPRODUCTIVE ORGANS: Prostate is noted  URINARY BLADDER: No mass or calculus. BONES: No destructive bone lesion. ABDOMINAL WALL: No mass or hernia. ADDITIONAL COMMENTS: N/A     IMPRESSION  No acute abdominal or pelvic pathology. Evidence of gastric bypass procedure. No  bowel obstruction or perforation. Severe calcific coronary artery  atherosclerosis. Recent Days:  Recent Labs     08/30/21  0033 08/29/21  2136   WBC 16.7* 17.0*   HGB 13.1 13.5   HCT 38.3 40.2    303     Recent Labs     08/30/21  0033 08/29/21  2141 08/29/21  2136     --  140   K 5.6*  --  5.5*   *  --  111*   CO2 24  --  23   *  --  143*   BUN 34*  --  33*   CREA 0.76  --  1.17   CA 7.7*  --  7.8*   ALB  --   --  3.2*   TBILI  --   --  0.7   ALT  --   --  21   INR  --  1.1  --      No results for input(s): PH, PCO2, PO2, HCO3, FIO2 in the last 72 hours.     24 Hour Results:  Recent Results (from the past 24 hour(s))   PLASMA, ALLOCATE    Collection Time: 08/29/21  9:30 PM   Result Value Ref Range    Unit number [id number]     Blood component type FP 24h,Thaw     Unit division 00     Status of unit ISSUED     Unit number [id number]     Blood component type FP 24h,Thaw     Unit division 00     Status of unit DISCARDED,INCINERATED    EMERGENT RELEASE OF UNCROSSMATCHED RED CELLS    Collection Time: 08/29/21  9:36 PM   Result Value Ref Range    Crossmatch Expiration 09/01/2021,2359     ABO/Rh(D) A NEGATIVE     Antibody screen NEG     Unit number [id number]     Blood component type RC LR     Unit division 00     Status of unit ISSUED     Crossmatch result Compatible     Unit number [id number]     Blood component type RC LR     Unit division 00     Status of unit ISSUED     Crossmatch result Compatible     Unit number [id number]     Blood component type RC LR     Unit division 00     Status of unit ALLOCATED     Crossmatch result Compatible     Unit number [id number]     Blood component type RC LR,1     Unit division 00     Status of unit ALLOCATED     Crossmatch result Compatible    CBC WITH AUTOMATED DIFF    Collection Time: 08/29/21  9:36 PM   Result Value Ref Range    WBC 17.0 (H) 4.1 - 11.1 K/uL    RBC 4.33 4.10 - 5.70 M/uL    HGB 13.5 12.1 - 17.0 g/dL    HCT 40.2 36.6 - 50.3 %    MCV 92.8 80.0 - 99.0 FL    MCH 31.2 26.0 - 34.0 PG    MCHC 33.6 30.0 - 36.5 g/dL    RDW 13.5 11.5 - 14.5 %    PLATELET 224 080 - 033 K/uL    MPV 11.3 8.9 - 12.9 FL    NRBC 0.0 0  WBC    ABSOLUTE NRBC 0.00 0.00 - 0.01 K/uL    NEUTROPHILS 68 32 - 75 %    LYMPHOCYTES 19 12 - 49 %    MONOCYTES 7 5 - 13 %    EOSINOPHILS 4 0 - 7 %    BASOPHILS 1 0 - 1 %    IMMATURE GRANULOCYTES 1 (H) 0.0 - 0.5 %    ABS. NEUTROPHILS 11.7 (H) 1.8 - 8.0 K/UL    ABS. LYMPHOCYTES 3.3 0.8 - 3.5 K/UL    ABS. MONOCYTES 1.1 (H) 0.0 - 1.0 K/UL    ABS. EOSINOPHILS 0.7 (H) 0.0 - 0.4 K/UL    ABS. BASOPHILS 0.1 0.0 - 0.1 K/UL    ABS. IMM.  GRANS. 0.1 (H) 0.00 - 0.04 K/UL    DF AUTOMATED     METABOLIC PANEL, COMPREHENSIVE    Collection Time: 08/29/21  9:36 PM   Result Value Ref Range    Sodium 140 136 - 145 mmol/L    Potassium 5.5 (H) 3.5 - 5.1 mmol/L    Chloride 111 (H) 97 - 108 mmol/L    CO2 23 21 - 32 mmol/L    Anion gap 6 5 - 15 mmol/L    Glucose 143 (H) 65 - 100 mg/dL    BUN 33 (H) 6 - 20 MG/DL    Creatinine 1.17 0.70 - 1.30 MG/DL    BUN/Creatinine ratio 28 (H) 12 - 20      GFR est AA >60 >60 ml/min/1.73m2    GFR est non-AA >60 >60 ml/min/1.73m2    Calcium 7.8 (L) 8.5 - 10.1 MG/DL    Bilirubin, total 0.7 0.2 - 1.0 MG/DL    ALT (SGPT) 21 12 - 78 U/L    AST (SGOT) 32 15 - 37 U/L    Alk. phosphatase 55 45 - 117 U/L    Protein, total 6.0 (L) 6.4 - 8.2 g/dL    Albumin 3.2 (L) 3.5 - 5.0 g/dL    Globulin 2.8 2.0 - 4.0 g/dL    A-G Ratio 1.1 1.1 - 2.2     PROTHROMBIN TIME + INR    Collection Time: 08/29/21  9:41 PM   Result Value Ref Range    INR 1.1 0.9 - 1.1      Prothrombin time 11.7 (H) 9.0 - 75.2 sec   METABOLIC PANEL, BASIC    Collection Time: 08/30/21 12:33 AM   Result Value Ref Range    Sodium 142 136 - 145 mmol/L    Potassium 5.6 (H) 3.5 - 5.1 mmol/L    Chloride 114 (H) 97 - 108 mmol/L    CO2 24 21 - 32 mmol/L    Anion gap 4 (L) 5 - 15 mmol/L    Glucose 104 (H) 65 - 100 mg/dL    BUN 34 (H) 6 - 20 MG/DL    Creatinine 0.76 0.70 - 1.30 MG/DL    BUN/Creatinine ratio 45 (H) 12 - 20      GFR est AA >60 >60 ml/min/1.73m2    GFR est non-AA >60 >60 ml/min/1.73m2    Calcium 7.7 (L) 8.5 - 10.1 MG/DL   CBC WITH AUTOMATED DIFF    Collection Time: 08/30/21 12:33 AM   Result Value Ref Range    WBC 16.7 (H) 4.1 - 11.1 K/uL    RBC 4.26 4.10 - 5.70 M/uL    HGB 13.1 12.1 - 17.0 g/dL    HCT 38.3 36.6 - 50.3 %    MCV 89.9 80.0 - 99.0 FL    MCH 30.8 26.0 - 34.0 PG    MCHC 34.2 30.0 - 36.5 g/dL    RDW 13.6 11.5 - 14.5 %    PLATELET 010 807 - 605 K/uL    MPV 11.4 8.9 - 12.9 FL    NRBC 0.0 0  WBC    ABSOLUTE NRBC 0.00 0.00 - 0.01 K/uL    NEUTROPHILS 92 (H) 32 - 75 %    BAND NEUTROPHILS 3 0 - 6 %    LYMPHOCYTES 4 (L) 12 - 49 %    MONOCYTES 0 (L) 5 - 13 %    EOSINOPHILS 0 0 - 7 %    BASOPHILS 0 0 - 1 %    MYELOCYTES 1 (H) 0 %    IMMATURE GRANULOCYTES 0 %    ABS. NEUTROPHILS 15.9 (H) 1.8 - 8.0 K/UL    ABS. LYMPHOCYTES 0.7 (L) 0.8 - 3.5 K/UL    ABS. MONOCYTES 0.0 0.0 - 1.0 K/UL    ABS. EOSINOPHILS 0.0 0.0 - 0.4 K/UL    ABS.  BASOPHILS 0.0 0.0 - 0.1 K/UL ABS. IMM. GRANS. 0.0 K/UL    DF MANUAL      RBC COMMENTS NORMOCYTIC, NORMOCHROMIC         Medications reviewed  Current Facility-Administered Medications   Medication Dose Route Frequency    sodium chloride (NS) flush 5-40 mL  5-40 mL IntraVENous Q8H    sodium chloride (NS) flush 5-40 mL  5-40 mL IntraVENous PRN    0.9% sodium chloride infusion  125 mL/hr IntraVENous CONTINUOUS    sodium polystyrene (KAYEXALATE) 15 gram/60 mL oral suspension 15 g  15 g Oral NOW    0.9% sodium chloride infusion 250 mL  250 mL IntraVENous PRN    0.9% sodium chloride infusion 250 mL  250 mL IntraVENous PRN     No current outpatient medications on file. Care Plan discussed with: Patient/Family    Total time spent with patient and review of records: 30 minutes.     Vida Mera MD

## 2021-08-30 NOTE — CONSULTS
Surgery Consult    Subjective:      Alee Hawkins is a 78 y.o. male with medical hx sig for CAD, HTN, hyperlipidemia, who presents with 1 day hx of BRB with stooling, weakness, significant hypotension on arrival (systolics into 33O). Pt denies pain. No fever, chills, weight loss. No n/v. No known hx of hemorrhoids. Denies any abdominal pain. Hx of open Gregg-en-Y in 2005 so frequent loose stools normal.  Last colonoscopy approx 4 years ago and no known polyps or diverticulosis that he remembers . No fam hx of colon cancer or polyps known. Pertinent labs- WBC 17, Hgb 13.5, plts 303, K 5.5, Cr 1.17, LFTs WNL.       Given 2u PRBC and 1FFP on arrival for sig hypotension. Stable currently. On aspirin- no other anticoags. Plan is for colonoscopy tomorrow    No past medical history on file. PSHx: Gastric Bypass   No family history on file. Social History     Socioeconomic History    Marital status:      Spouse name: Not on file    Number of children: Not on file    Years of education: Not on file    Highest education level: Not on file     Social Determinants of Health     Financial Resource Strain:     Difficulty of Paying Living Expenses:    Food Insecurity:     Worried About Running Out of Food in the Last Year:     [address] in the Last Year:    Transportation Needs:     Lack of Transportation (Medical):      Lack of Transportation (Non-Medical):    Physical Activity:     Days of Exercise per Week:     Minutes of Exercise per Session:    Stress:     Feeling of Stress :    Social Connections:     Frequency of Communication with Friends and Family:     Frequency of Social Gatherings with Friends and Family:     Attends [employer] Services:     Active Member of Clubs or Organizations:     Attends Club or Organization Meetings:     Marital Status:       Current Facility-Administered Medications   Medication Dose Route Frequency    sodium chloride (NS) flush 5-40 mL  5-40 mL IntraVENous Q8H    sodium chloride (NS) flush 5-40 mL  5-40 mL IntraVENous PRN    0.9% sodium chloride infusion  125 mL/hr IntraVENous CONTINUOUS    bisacodyL (DULCOLAX) tablet 10 mg  10 mg Oral Q2H    0.9% sodium chloride infusion 250 mL  250 mL IntraVENous PRN    0.9% sodium chloride infusion 250 mL  250 mL IntraVENous PRN     Current Outpatient Medications   Medication Sig    lisinopriL (PRINIVIL, ZESTRIL) 40 mg tablet Take 40 mg by mouth daily.  rosuvastatin (CRESTOR) 10 mg tablet Take 10 mg by mouth daily.  aspirin delayed-release 81 mg tablet Take 81 mg by mouth daily.  multivitamin (ONE A DAY) tablet Take 1 Tablet by mouth daily.  omega 3-DHA-EPA-fish oil 1,000 mg (120 mg-180 mg) capsule Take 1 Capsule by mouth daily.  cholecalciferol, vitamin D3, (Vitamin D3) 50 mcg (2,000 unit) tab Take 2,000 Units by mouth daily.  calcium carbonate (OS-DAYSI) 500 mg calcium (1,250 mg) tablet Take 1 Tablet by mouth daily as needed for Indigestion.       No Known Allergies    Review of Systems:REVIEW OF SYSTEMS:     []     Unable to obtain  ROS due to  []    mental status change  []    sedated   []    intubated   [x]    Total of 12 systems reviewed as follows:    Constitutional: neg for fevers, chills, weight loss, malaise  Eyes: negative for blurry vision  Ears, nose, mouth, throat, and face: negative for sore throat  Respiratory: negative for SOB  Cardiovascular: negative for CP  Gastrointestinal: negative for nausea, vomiting, abdominal pain, POSITIVE for diarrhea,  hematochezia  Genitourinary: negative for dysuria  Integument/breast: neg for skin rash  Hematologic/lymphatic: neg for bruising  Musculoskeletal: negative for muscle aches  Neurological: no dizziness or h/a    Objective:        Patient Vitals for the past 8 hrs:   BP Pulse Resp SpO2   21 1200 131/76 68 14 100 %   21 1000 (!) 115/52 66 17 97 %   21 0730 123/81 68 18 97 %       Temp (24hrs), Av.4 °F (36.9 °C), Min:97.6 °F (36.4 °C), Max:99.2 °F (37.3 °C)      Physical Exam:  General:  Alert, cooperative, no distress, appears stated age. Eyes:  Conjunctivae/corneas clear. Nose: Nares normal. Septum midline   Mouth/Throat: Lips, mucosa, and tongue normal.    Neck: Supple, symmetrical, trachea midline   Lungs:   Clear to auscultation bilaterally. Heart:  Regular rate and rhythm   Abdomen:   Soft, non-tender, non-distended, no rebound, no guarding, normal bowel sounds   Extremities: Extremities normal, atraumatic, no cyanosis or edema. Skin: Skin color, texture, turgor normal. No rashes or lesions   Neuro: Alert, oriented, speech clear     Labs:   Recent Labs     08/30/21  1232 08/30/21  0033 08/30/21  0033   WBC  --   --  16.7*   HGB 12.5   < > 13.1   HCT 36.1*   < > 38.3   PLT  --   --  211    < > = values in this interval not displayed. Recent Labs     08/30/21  0033 08/29/21  2136 08/29/21  2136      < > 140   K 5.6*   < > 5.5*   *   < > 111*   CO2 24   < > 23   *   < > 143*   BUN 34*   < > 33*   CREA 0.76   < > 1.17   CA 7.7*   < > 7.8*   ALB  --   --  3.2*   TBILI  --   --  0.7   ALT  --   --  21    < > = values in this interval not displayed. Recent Labs     08/29/21 2141   INR 1.1         Assessment and Plan:     79 y/o male with painless bleeding per rectum. Blood mixed in with stool. Last colonoscopy 4-5 yrs ago. For colonoscopy tomorrow  If not revealing of any bleeding or source of bleeding, may need subsequent scans to eval/treat. I discussed this with patient and wife.   Surgery last option      Signed By: Nash Desai MD     August 30, 2021

## 2021-08-31 ENCOUNTER — ANESTHESIA (OUTPATIENT)
Dept: ENDOSCOPY | Age: 66
DRG: 378 | End: 2021-08-31
Payer: MEDICARE

## 2021-08-31 ENCOUNTER — APPOINTMENT (OUTPATIENT)
Dept: GENERAL RADIOLOGY | Age: 66
DRG: 378 | End: 2021-08-31
Attending: INTERNAL MEDICINE
Payer: MEDICARE

## 2021-08-31 ENCOUNTER — ANESTHESIA EVENT (OUTPATIENT)
Dept: ENDOSCOPY | Age: 66
DRG: 378 | End: 2021-08-31
Payer: MEDICARE

## 2021-08-31 LAB
ATRIAL RATE: 70 BPM
CALCULATED P AXIS, ECG09: 7 DEGREES
CALCULATED R AXIS, ECG10: 11 DEGREES
CALCULATED T AXIS, ECG11: 29 DEGREES
DIAGNOSIS, 93000: NORMAL
HCT VFR BLD AUTO: 35.4 % (ref 36.6–50.3)
HGB BLD-MCNC: 12 G/DL (ref 12.1–17)
P-R INTERVAL, ECG05: 160 MS
Q-T INTERVAL, ECG07: 374 MS
QRS DURATION, ECG06: 78 MS
QTC CALCULATION (BEZET), ECG08: 403 MS
VENTRICULAR RATE, ECG03: 70 BPM

## 2021-08-31 PROCEDURE — 2709999900 HC NON-CHARGEABLE SUPPLY: Performed by: INTERNAL MEDICINE

## 2021-08-31 PROCEDURE — 85018 HEMOGLOBIN: CPT

## 2021-08-31 PROCEDURE — 74011250636 HC RX REV CODE- 250/636: Performed by: NURSE ANESTHETIST, CERTIFIED REGISTERED

## 2021-08-31 PROCEDURE — 88305 TISSUE EXAM BY PATHOLOGIST: CPT

## 2021-08-31 PROCEDURE — 36415 COLL VENOUS BLD VENIPUNCTURE: CPT

## 2021-08-31 PROCEDURE — 76040000019: Performed by: INTERNAL MEDICINE

## 2021-08-31 PROCEDURE — 74246 X-RAY XM UPR GI TRC 2CNTRST: CPT

## 2021-08-31 PROCEDURE — 74011250636 HC RX REV CODE- 250/636: Performed by: INTERNAL MEDICINE

## 2021-08-31 PROCEDURE — 0DBE8ZX EXCISION OF LARGE INTESTINE, VIA NATURAL OR ARTIFICIAL OPENING ENDOSCOPIC, DIAGNOSTIC: ICD-10-PCS | Performed by: INTERNAL MEDICINE

## 2021-08-31 PROCEDURE — 76060000031 HC ANESTHESIA FIRST 0.5 HR: Performed by: INTERNAL MEDICINE

## 2021-08-31 PROCEDURE — 77030013992 HC SNR POLYP ENDOSC BSC -B: Performed by: INTERNAL MEDICINE

## 2021-08-31 PROCEDURE — 65660000000 HC RM CCU STEPDOWN

## 2021-08-31 RX ORDER — NALOXONE HYDROCHLORIDE 0.4 MG/ML
0.4 INJECTION, SOLUTION INTRAMUSCULAR; INTRAVENOUS; SUBCUTANEOUS
Status: DISCONTINUED | OUTPATIENT
Start: 2021-08-31 | End: 2021-08-31 | Stop reason: HOSPADM

## 2021-08-31 RX ORDER — LISINOPRIL 20 MG/1
40 TABLET ORAL DAILY
Status: DISCONTINUED | OUTPATIENT
Start: 2021-09-01 | End: 2021-09-01 | Stop reason: HOSPADM

## 2021-08-31 RX ORDER — PROPOFOL 10 MG/ML
INJECTION, EMULSION INTRAVENOUS AS NEEDED
Status: DISCONTINUED | OUTPATIENT
Start: 2021-08-31 | End: 2021-08-31 | Stop reason: HOSPADM

## 2021-08-31 RX ORDER — DEXTROMETHORPHAN/PSEUDOEPHED 2.5-7.5/.8
1.2 DROPS ORAL
Status: DISCONTINUED | OUTPATIENT
Start: 2021-08-31 | End: 2021-08-31 | Stop reason: HOSPADM

## 2021-08-31 RX ORDER — FLUMAZENIL 0.1 MG/ML
0.2 INJECTION INTRAVENOUS
Status: DISCONTINUED | OUTPATIENT
Start: 2021-08-31 | End: 2021-08-31 | Stop reason: HOSPADM

## 2021-08-31 RX ORDER — FENTANYL CITRATE 50 UG/ML
100 INJECTION, SOLUTION INTRAMUSCULAR; INTRAVENOUS
Status: DISCONTINUED | OUTPATIENT
Start: 2021-08-31 | End: 2021-08-31 | Stop reason: HOSPADM

## 2021-08-31 RX ORDER — SODIUM CHLORIDE 9 MG/ML
50 INJECTION, SOLUTION INTRAVENOUS CONTINUOUS
Status: DISPENSED | OUTPATIENT
Start: 2021-08-31 | End: 2021-08-31

## 2021-08-31 RX ORDER — ROSUVASTATIN CALCIUM 10 MG/1
10 TABLET, COATED ORAL DAILY
Status: DISCONTINUED | OUTPATIENT
Start: 2021-09-01 | End: 2021-09-01 | Stop reason: HOSPADM

## 2021-08-31 RX ORDER — ATROPINE SULFATE 0.1 MG/ML
0.5 INJECTION INTRAVENOUS
Status: DISCONTINUED | OUTPATIENT
Start: 2021-08-31 | End: 2021-08-31 | Stop reason: HOSPADM

## 2021-08-31 RX ORDER — MIDAZOLAM HYDROCHLORIDE 1 MG/ML
.25-5 INJECTION, SOLUTION INTRAMUSCULAR; INTRAVENOUS
Status: DISCONTINUED | OUTPATIENT
Start: 2021-08-31 | End: 2021-08-31 | Stop reason: HOSPADM

## 2021-08-31 RX ORDER — SODIUM CHLORIDE 9 MG/ML
INJECTION, SOLUTION INTRAVENOUS
Status: DISCONTINUED | OUTPATIENT
Start: 2021-08-31 | End: 2021-08-31 | Stop reason: HOSPADM

## 2021-08-31 RX ORDER — EPINEPHRINE 0.1 MG/ML
1 INJECTION INTRACARDIAC; INTRAVENOUS
Status: DISCONTINUED | OUTPATIENT
Start: 2021-08-31 | End: 2021-08-31 | Stop reason: HOSPADM

## 2021-08-31 RX ADMIN — SODIUM CHLORIDE: 900 INJECTION, SOLUTION INTRAVENOUS at 07:10

## 2021-08-31 RX ADMIN — PROPOFOL 50 MG: 10 INJECTION, EMULSION INTRAVENOUS at 07:23

## 2021-08-31 RX ADMIN — SODIUM CHLORIDE 50 ML/HR: 9 INJECTION, SOLUTION INTRAVENOUS at 07:05

## 2021-08-31 RX ADMIN — PROPOFOL 80 MG: 10 INJECTION, EMULSION INTRAVENOUS at 07:17

## 2021-08-31 RX ADMIN — Medication 10 ML: at 05:06

## 2021-08-31 RX ADMIN — PROPOFOL 50 MG: 10 INJECTION, EMULSION INTRAVENOUS at 07:27

## 2021-08-31 RX ADMIN — PROPOFOL 20 MG: 10 INJECTION, EMULSION INTRAVENOUS at 07:19

## 2021-08-31 RX ADMIN — PROPOFOL 50 MG: 10 INJECTION, EMULSION INTRAVENOUS at 07:25

## 2021-08-31 RX ADMIN — SODIUM CHLORIDE 125 ML/HR: 9 INJECTION, SOLUTION INTRAVENOUS at 05:12

## 2021-08-31 RX ADMIN — PROPOFOL 100 MG: 10 INJECTION, EMULSION INTRAVENOUS at 07:15

## 2021-08-31 RX ADMIN — PROPOFOL 50 MG: 10 INJECTION, EMULSION INTRAVENOUS at 07:30

## 2021-08-31 RX ADMIN — PROPOFOL 50 MG: 10 INJECTION, EMULSION INTRAVENOUS at 07:21

## 2021-08-31 RX ADMIN — Medication 10 ML: at 22:18

## 2021-08-31 NOTE — PERIOP NOTES
Zazueta Rutledge  1955  [id number]       0615    Situation:    Scheduled Procedure: Procedure(s):  COLONOSCOPY  ENDOSCOPIC POLYPECTOMY  Verbal report received from: Darin Soler RN  Preoperative diagnosis: GI Bleed    Background:    Procedure: Procedure(s):  COLONOSCOPY  ENDOSCOPIC POLYPECTOMY  Physician performing procedure; Dr. Yasmani Thompson PO Intake: 10Pm  8/30    Pregnancy Test:Not applicable If yes, result: none    Is the patient taking Blood Thinners: NO     Is the patient diabetic:no               Does the patient have a Pacemaker/Defibrillator in place?: no   Does the patient need antibiotics before/during/after procedure: no   If the patient is having a colon, How much prep was drank? all   What were the Colon prep results? Liquid/yellow   Does the patient have SCD in place:no   Is patient on CONTACT precautions:no        Assessment:  Are the vital signs stable prior to patient coming to ENDO?  yes  Is the patient alert/oriented and able to sign consent for the procedures:yes  How does the patient's abdomen feel prior to coming to ENDO? round and soft yes   Does the patient have a patient IV in place? Yes  LAC  RFA     Recommendation:  Family or Friend present no     Bedside report, brought pt up in ER stretcher, save room for pt to return post-procedure. [id number]  Anesthesia staff at patient's bedside administering anesthesia and monitoring patients vital signs throughout procedure. See anesthesia note. Post procedure, report received from Shon DURAN. [id number]  Endoscope was pre-cleaned at bedside immediately following procedure by endo Yvon hay. [id number]  Patient tolerated procedure. Abdomen soft and patient arousable and voices no complaints. Patient transported to endoscopy recovery area. Report given to post procedure RNLisette.      0800    TRANSFER - OUT REPORT:    Verbal report given to Minoo Hammer RN on ZazuetaFeedVisor being transferred to ER 11 for routine progression of care       Report consisted of patients Situation, Background, Assessment and   Recommendations(SBAR). Information from the following report(s) SBAR and Procedure Summary was reviewed with the receiving nurse. Lines:   Peripheral IV 08/29/21 Anterior;Proximal;Right Forearm (Active)   Site Assessment Clean, dry, & intact 08/31/21 0644   Phlebitis Assessment 0 08/31/21 0644   Infiltration Assessment 0 08/31/21 0644   Dressing Status Clean, dry, & intact 08/31/21 0644   Dressing Type Tape;Transparent 08/31/21 0644   Hub Color/Line Status Flushed; Infusing;Pink 08/31/21 0644   Action Taken Open ports on tubing capped 08/31/21 0644   Alcohol Cap Used Yes 08/31/21 0644        Opportunity for questions and clarification was provided. Patient transported with:   Registered Nurse   In [address] [place] [address]. Pt's glasses and cell phone  Pt Chart.    1 Patent IV

## 2021-08-31 NOTE — PROGRESS NOTES
Bedside and Verbal shift change report given to Iveth Inman RN (oncoming nurse) by Guillermina Milan RN (offgoing nurse). Report included the following information SBAR, Kardex, MAR, Accordion and Recent Results.

## 2021-08-31 NOTE — PROGRESS NOTES
Dora Cottrell  1955  [id number]    Situation:  Verbal report received from: Keegan Amin RN   Procedure: Procedure(s):  COLONOSCOPY  ENDOSCOPIC POLYPECTOMY    Background:    Preoperative diagnosis: GI Bleed  Postoperative diagnosis: Polyps, diverticulosis. :  Dr. Nithin Hoover  Assistant(s): Endoscopy Technician-1: Francia Pelletier  Endoscopy RN-1: Rickey Baxter RN    Specimens:   ID Type Source Tests Collected by Time Destination   1 : colon polyps Preservative   Lilly Tracey MD 8/31/2021 4646 Pathology     H. Pylori  no    Assessment:  Intra-procedure medications   Anesthesia gave intra-procedure sedation and medications, see anesthesia flow sheet yes    Intravenous fluids: NS@ KVO     Vital signs stable yes    Abdominal assessment: round and soft yes. Recommendation:  Discharge patient per MD order yes.   Return to floor ER   Family or Friend NA  Permission to share finding with family or friend yes

## 2021-08-31 NOTE — PROGRESS NOTES
Bedside and Verbal shift change report given to [facility] (oncoming nurse) by JULIENNE Lozoya RN (offgoing nurse). Report given with SBAR, Kardex, Intake/Output, MAR and Recent Results.

## 2021-08-31 NOTE — PROCEDURES
[address]  [phone]      2021    Colonoscopy Procedure Note  [facility]  :  1955  [place] Medical Record Number: [id number]    Indications:   acute blood loss anemia  PCP:  Jaylin Campbell DO  Anesthesia/Sedation: see nursing notes  Endoscopist:  Dr. Nila Villa  Assistants: None  Complications:  None  Estimate Blood Loss:  None    Permit:  The indications, risks, benefits and alternatives were reviewed with the patient or their decision maker who was provided an opportunity to ask questions and all questions were answered. The specific risks of colonoscopy with conscious sedation were reviewed, including but not limited to anesthetic complication, bleeding, adverse drug reaction, missed lesion, infection, IV site reactions, and intestinal perforation which would lead to the need for surgical repair. Alternatives to colonoscopy including radiographic imaging, observation without testing, or laboratory testing were reviewed including the limitations of those alternatives. After considering the options and having all their questions answered, the patient or their decision maker provided both verbal and written consent to proceed. Procedure in Detail:  After obtaining informed consent, positioning of the patient in the left lateral decubitus position, and conduction of a pre-procedure pause or \"time out\" the endoscope was introduced into the anus and advanced to the terminal ileum. The quality of the colonic preparation was good. A careful inspection was made as the colonoscope was withdrawn, findings and interventions are described below.     Appendiceal orifice photographed    Findings:       - Diverticulosis; no inflammation; no blood seen anywhere on this exam  Five sessile polyps, multiple locations, none larger than 10 mm    Specimens:    all polyps removed cold snare    Implants: none    Complications:   None; patient tolerated the procedure well. Estimated blood loss: none    Impression:  See post-procedure diagnoses    Recommendations:     - Repeat colonoscopy in 3 years. - upper gi , SBS   If negative XR, have no objection DC; he understands can not drive today if Dc home    Thank you for entrusting me with this patient's care. Please do not hesitate to contact me with any questions or if I can be of assistance with any of your other patients' GI needs.     Signed By: Susanne Parker MD                        August 31, 2021

## 2021-08-31 NOTE — ANESTHESIA POSTPROCEDURE EVALUATION
Procedure(s):  COLONOSCOPY  ENDOSCOPIC POLYPECTOMY. MAC    Anesthesia Post Evaluation        Patient location during evaluation: bedside  Patient participation: complete - patient participated  Level of consciousness: awake and alert  Pain management: adequate  Airway patency: patent  Anesthetic complications: no  Cardiovascular status: acceptable  Respiratory status: acceptable  Hydration status: acceptable  Post anesthesia nausea and vomiting:  none  Final Post Anesthesia Temperature Assessment:  Normothermia (36.0-37.5 degrees C)      INITIAL Post-op Vital signs:   Vitals Value Taken Time   /73 08/31/21 1010   Temp 36.9 °C (98.4 °F) 08/31/21 0742   Pulse 61 08/31/21 1040   Resp 13 08/31/21 0920   SpO2 98 % 08/31/21 0801   Vitals shown include unvalidated device data.

## 2021-08-31 NOTE — ANESTHESIA PREPROCEDURE EVALUATION
Relevant Problems   CARDIOVASCULAR   (+) CAD (coronary artery disease)   (+) HTN (hypertension)       Anesthetic History   No history of anesthetic complications            Review of Systems / Medical History  Patient summary reviewed and pertinent labs reviewed    Pulmonary        Sleep apnea: No treatment           Neuro/Psych   Within defined limits           Cardiovascular    Hypertension          Hyperlipidemia    Exercise tolerance: >4 METS     GI/Hepatic/Renal  Within defined limits              Endo/Other  Within defined limits           Other Findings            Physical Exam    Airway  Mallampati: II  TM Distance: 4 - 6 cm  Neck ROM: normal range of motion   Mouth opening: Normal     Cardiovascular    Rhythm: regular  Rate: normal         Dental  No notable dental hx       Pulmonary  Breath sounds clear to auscultation               Abdominal  GI exam deferred       Other Findings            Anesthetic Plan    ASA: 2  Anesthesia type: MAC          Induction: Intravenous  Anesthetic plan and risks discussed with: Patient

## 2021-08-31 NOTE — PROGRESS NOTES
Dr. Pablito Castanon discussed with patient procedure findings and next steps. Dr. Jo Olmedo is in examing/talking to patient.

## 2021-08-31 NOTE — PROGRESS NOTES
TRANSFER - IN REPORT:    Verbal report received from [address] [place] [address] (name) on Thena Lefort  being received from ER (unit) for routine progression of care      Report consisted of patients Situation, Background, Assessment and   Recommendations(SBAR). Information from the following report(s) SBAR, Kardex, MAR, Accordion and Recent Results was reviewed with the receiving nurse. Opportunity for questions and clarification was provided. Assessment completed upon patients arrival to unit and care assumed.

## 2021-08-31 NOTE — PROGRESS NOTES
Surgery Progress Note    Subjective:      Pt reports no more LGIB. No abdominal pain. Colonoscopy done yesterday without findings of active bleed. GI ordered upper GI today for eval.    VSS stable today  Hgb stable. Past Medical History:   Diagnosis Date    History of umbilical hernia repair     Hyperlipidemia     Hypertension     Sleep apnea     no cpap use     Past Surgical History:   Procedure Laterality Date    COLONOSCOPY N/A 8/31/2021    COLONOSCOPY performed by Marisabel [facility] MD at [facility] HX GASTRIC BYPASS  2005      Family History   Problem Relation Age of Onset    Heart Disease Mother     Cancer Father         lung     Alcohol abuse Sister      Social History     Socioeconomic History    Marital status:      Spouse name: Not on file    Number of children: Not on file    Years of education: Not on file    Highest education level: Not on file   Tobacco Use    Smoking status: Current Every Day Smoker     Packs/day: 0.50    Smokeless tobacco: Never Used   Vaping Use    Vaping Use: Never used   Substance and Sexual Activity    Alcohol use: Not Currently     Comment: 2013 quit drinking     Social Determinants of Health     Financial Resource Strain:     Difficulty of Paying Living Expenses:    Food Insecurity:     Worried About Running Out of Food in the Last Year:     Ran Out of Food in the Last Year:    Transportation Needs:     Lack of Transportation (Medical):      Lack of Transportation (Non-Medical):    Physical Activity:     Days of Exercise per Week:     Minutes of Exercise per Session:    Stress:     Feeling of Stress :    Social Connections:     Frequency of Communication with Friends and Family:     Frequency of Social Gatherings with Friends and Family:     Attends [employer]ism Services:     Active Member of Clubs or Organizations:     Attends Club or Organization Meetings:     Marital Status:       Current Facility-Administered Medications Medication Dose Route Frequency    sodium chloride (NS) flush 5-40 mL  5-40 mL IntraVENous Q8H    sodium chloride (NS) flush 5-40 mL  5-40 mL IntraVENous PRN    0.9% sodium chloride infusion  125 mL/hr IntraVENous CONTINUOUS    0.9% sodium chloride infusion 250 mL  250 mL IntraVENous PRN    0.9% sodium chloride infusion 250 mL  250 mL IntraVENous PRN     Current Outpatient Medications   Medication Sig    lisinopriL (PRINIVIL, ZESTRIL) 40 mg tablet Take 40 mg by mouth daily.  rosuvastatin (CRESTOR) 10 mg tablet Take 10 mg by mouth daily.  aspirin delayed-release 81 mg tablet Take 81 mg by mouth daily.  multivitamin (ONE A DAY) tablet Take 1 Tablet by mouth daily.  omega 3-DHA-EPA-fish oil 1,000 mg (120 mg-180 mg) capsule Take 1 Capsule by mouth daily.  cholecalciferol, vitamin D3, (Vitamin D3) 50 mcg (2,000 unit) tab Take 2,000 Units by mouth daily.  calcium carbonate (OS-DAYSI) 500 mg calcium (1,250 mg) tablet Take 1 Tablet by mouth daily as needed for Indigestion.       No Known Allergies    Review of Systems:REVIEW OF SYSTEMS:     []     Unable to obtain  ROS due to  []    mental status change  []    sedated   []    intubated   [x]    Total of 12 systems reviewed as follows:    Constitutional: neg for fevers, chills, weight loss, malaise  Eyes: negative for blurry vision or double vision  Ears, nose, mouth, throat, and face: negative for sore throat, negative for lip swelling  Respiratory: negative for SOB and cough  Cardiovascular: negative for CP, negative for palpitations  Gastrointestinal: negative for nausea, vomiting, abdominal pain, constipation, melena, no more bloody stools  Genitourinary: negative for dysuria  Integument/breast: negative for skin rash  Hematologic/lymphatic: negative for bruising  Musculoskeletal: negative for muscle aches  Neurological:  No dizziness    Objective:        Patient Vitals for the past 8 hrs:   BP Temp Pulse Resp SpO2   08/31/21 0756 135/72  66 13 97 %   21 0751 120/76  68 19 98 %   21 0746 117/66  67 17 96 %   21 0742 109/63 98.4 °F (36.9 °C) 70 19 95 %   21 0734 123/75 98 °F (36.7 °C) 78 16 97 %   21 0642 135/70 98.5 °F (36.9 °C) 66 15 98 %   21 0505 (!) 150/78 98 °F (36.7 °C) 65  96 %       Temp (24hrs), Av.2 °F (36.8 °C), Min:98 °F (36.7 °C), Max:98.5 °F (36.9 °C)      Physical Exam:  General:  Alert, cooperative, no distress, appears stated age. Eyes:  Conjunctivae/corneas clear. Nose: Nares normal. Septum midline   Mouth/Throat: Lips, mucosa, and tongue normal.    Neck: Supple, symmetrical, trachea midline   Lungs:   Clear to auscultation bilaterally. Heart:  Regular rate and rhythm   Abdomen:   Soft, non-tender, non-distended, no rebound, no guarding, normal bowel sounds, old midline incision appreciated from Gregg-en- Y. Rectal exam deferred   Extremities: Extremities normal, atraumatic, no cyanosis or edema. Skin: Skin color, texture, turgor normal. No rashes or lesions   Neuro: Alert, oriented, speech clear     Labs:   Recent Labs     21  0512 21  1232 21  0033   WBC  --   --  16.7*   HGB 12.0*   < > 13.1   HCT 35.4*   < > 38.3   PLT  --   --  211    < > = values in this interval not displayed. Recent Labs     21  0033 21  2136 21      < > 140   K 5.6*   < > 5.5*   *   < > 111*   CO2 24   < > 23   *   < > 143*   BUN 34*   < > 33*   CREA 0.76   < > 1.17   CA 7.7*   < > 7.8*   ALB  --   --  3.2*   TBILI  --   --  0.7   ALT  --   --  21    < > = values in this interval not displayed. Recent Labs     21  2141   INR 1.1         Assessment and Plan:     Pt currently stable- feeling well post colonoscopy  Awaiting Small bowel follow through. Will await results.     Per gen surg standpoint pt can be discharged with as needed follow-up  If more episodes of bleeding recommend getting CT with contrast as soon as possible      Pt seen and discussed at bedside with dr Kae Andrade.       Signed By: Cecily Vaca NP     August 31, 2021

## 2021-08-31 NOTE — ED NOTES
Pt given ginger ale and crackers. Wife now at bedside. Update given and patient says he feels like know one is in charge of his care. Pt appears frustrated and wife asked to speak to pt. Advocacy.   Nurse called pt advocacy and left a vm at 1514

## 2021-08-31 NOTE — ED NOTES
Pt given an explanation of wait. Nurse spoke to Dr Daylin Virk and pt can have diet she said and she will look over the results soon.

## 2021-08-31 NOTE — PROGRESS NOTES
Daily Progress Note: 8/31/2021  Enmanuel Jaramillo    Santiago Ahumada,     Assessment/Plan:   Hematochezia. -CT of the abdomen is unremarkable. -Patient was transfused 2 units of packed RBC and 1 FFP in ER   -Check H&H every 8 hours. -GI and surgery consult   -Colonoscopy today    Hyperkalemia (8/29/2021). -Hold lisinopril and monitor blood chemistry     Leukocytosis (8/29/2021). Doubt infection.    -Likely secondary to stress due to his GI bleed. -Monitor       CAD (coronary artery disease) (8/29/2021). -Hold aspirin     HTN (hypertension) (8/29/2021). -Hold all BP meds medicine due to hypotension     Hyperlipidemia (8/29/2021). -Hold Crestor               Problem List:  Problem List as of 8/31/2021 Date Reviewed: 8/29/2021        Codes Class Noted - Resolved    GI bleed ICD-10-CM: K92.2  ICD-9-CM: 578.9  8/29/2021 - Present        Hyperkalemia ICD-10-CM: E87.5  ICD-9-CM: 276.7  8/29/2021 - Present        Leukocytosis ICD-10-CM: U55.178  ICD-9-CM: 288.60  8/29/2021 - Present        CAD (coronary artery disease) ICD-10-CM: I25.10  ICD-9-CM: 414.00  8/29/2021 - Present        HTN (hypertension) ICD-10-CM: I10  ICD-9-CM: 401.9  8/29/2021 - Present        Hyperlipidemia ICD-10-CM: E78.5  ICD-9-CM: 272.4  8/29/2021 - Present              HPI:   Mr. Abner Lubin is a 78 y.o.  male who is admitted with hematochezia. Mr. Abner Lubin with past medical history of CAD, hypertension, hyperlipidemia presented to ER complaining of rectal bleeding which started today. Has about 6 times rectal bleeding with bright red blood and patient became weak. On arrival his blood pressure was low and started blood transfusion and FFP. Patient received 2 units of packed RBC and 1 unit of FFP. Patient denies rectal pain or abdominal pain, rectal bleeding is painless. Never had similar symptoms in the past.  Had normal colonoscopy about 4 years ago at [facility].  (Dr Ashley Van)    8/30:  Had another bloody stool this am. Dizzy with standing. BP stable at this time. No abd pain. No NSAID use. Had gastric bypass years ago. Taking supplements. : For colonoscopy today. No complaints this am.     Review of Systems:   A comprehensive review of systems was negative except for that written in the HPI. Objective:   Physical Exam:     Visit Vitals  /72   Pulse 66   Temp 98.4 °F (36.9 °C)   Resp 13   Ht 5' 7\" (1.702 m)   Wt 203 lb (92.1 kg)   SpO2 97%   BMI 31.79 kg/m²      O2 Device: None (Room air)    Temp (24hrs), Av.2 °F (36.8 °C), Min:98 °F (36.7 °C), Max:98.5 °F (36.9 °C)    701 - 1900  In: 300 [I.V.:300]  Out: -     190 -  0700  In: 3054.7 [I.V.:2000]  Out: 400 [Urine:400]    General:  Alert, cooperative, no distress, appears stated age. Head:  Normocephalic, without obvious abnormality, atraumatic. Eyes:  Conjunctivae/corneas clear. Nose: Nares normal. Septum midline. Mucosa normal. No drainage or sinus tenderness. Throat: Lips, mucosa, and tongue normal.    Neck: Supple, symmetrical, trachea midline, no adenopathy, thyroid: no enlargement/tenderness/nodules, no carotid bruit and no JVD. Back:   Symmetric, no curvature. ROM normal. No CVA tenderness. Lungs:   Clear to auscultation bilaterally. Chest wall:  No tenderness or deformity. Heart:  Regular rate and rhythm, S1, S2 normal, no murmur, click, rub or gallop. Abdomen:   Soft, non-tender. Bowel sounds normal. No masses,  No organomegaly. Extremities: Extremities normal, atraumatic, no cyanosis or edema. No calf tenderness or cords. Pulses: 2+ and symmetric all extremities. Skin: Skin color, texture, turgor normal. No rashes or lesions   Neurologic: CNII-XII intact. Alert and oriented X 3. Fine motor of hands and fingers normal.   equal.  No cogwheeling or rigidity. Gait not tested at this time. Sensation grossly normal to touch.   Gross motor of extremities normal.       Data Review:   CT Abd/Pelvis 8/29/21       FINDINGS:   LOWER THORAX: Severe coronary artery calcific atherosclerosis. LIVER: No mass. BILIARY TREE: Status post cholecystectomy. Mild CBD dilatation. SPLEEN: Calcified splenic granulomas  PANCREAS: No mass or ductal dilatation. ADRENALS: Left adrenal myelolipoma measures 3.8 cm. KIDNEYS: No mass, calculus, or hydronephrosis. STOMACH: Evidence of gastric bypass procedure. SMALL BOWEL: No dilatation or wall thickening. COLON: No dilatation or wall thickening. APPENDIX: Normal  PERITONEUM: No ascites or pneumoperitoneum. RETROPERITONEUM: No lymphadenopathy or aortic aneurysm. REPRODUCTIVE ORGANS: Prostate is noted  URINARY BLADDER: No mass or calculus. BONES: No destructive bone lesion. ABDOMINAL WALL: No mass or hernia. ADDITIONAL COMMENTS: N/A     IMPRESSION  No acute abdominal or pelvic pathology. Evidence of gastric bypass procedure. No  bowel obstruction or perforation. Severe calcific coronary artery  atherosclerosis. Recent Days:  Recent Labs     08/31/21  0512 08/30/21  2249 08/30/21  1232 08/30/21  0033 08/30/21  0033 08/29/21  2136 08/29/21 2136   WBC  --   --   --   --  16.7*  --  17.0*   HGB 12.0* 12.2 12.5   < > 13.1   < > 13.5   HCT 35.4* 36.0* 36.1*   < > 38.3   < > 40.2   PLT  --   --   --   --  211  --  303    < > = values in this interval not displayed. Recent Labs     08/30/21  0033 08/29/21 2141 08/29/21 2136     --  140   K 5.6*  --  5.5*   *  --  111*   CO2 24  --  23   *  --  143*   BUN 34*  --  33*   CREA 0.76  --  1.17   CA 7.7*  --  7.8*   ALB  --   --  3.2*   TBILI  --   --  0.7   ALT  --   --  21   INR  --  1.1  --      No results for input(s): PH, PCO2, PO2, HCO3, FIO2 in the last 72 hours.     24 Hour Results:  Recent Results (from the past 24 hour(s))   HGB & HCT    Collection Time: 08/30/21 12:32 PM   Result Value Ref Range    HGB 12.5 12.1 - 17.0 g/dL    HCT 36.1 (L) 36.6 - 50.3 %   COVID-19 RAPID TEST    Collection Time: 08/30/21 12:32 PM   Result Value Ref Range    Specimen source Nasopharyngeal      COVID-19 rapid test Not detected NOTD     HGB & HCT    Collection Time: 08/30/21 10:49 PM   Result Value Ref Range    HGB 12.2 12.1 - 17.0 g/dL    HCT 36.0 (L) 36.6 - 50.3 %   HGB & HCT    Collection Time: 08/31/21  5:12 AM   Result Value Ref Range    HGB 12.0 (L) 12.1 - 17.0 g/dL    HCT 35.4 (L) 36.6 - 50.3 %       Medications reviewed  Current Facility-Administered Medications   Medication Dose Route Frequency    0.9% sodium chloride infusion  50 mL/hr IntraVENous CONTINUOUS    sodium chloride (NS) flush 5-40 mL  5-40 mL IntraVENous Q8H    sodium chloride (NS) flush 5-40 mL  5-40 mL IntraVENous PRN    0.9% sodium chloride infusion  125 mL/hr IntraVENous CONTINUOUS    0.9% sodium chloride infusion 250 mL  250 mL IntraVENous PRN    0.9% sodium chloride infusion 250 mL  250 mL IntraVENous PRN     Current Outpatient Medications   Medication Sig    lisinopriL (PRINIVIL, ZESTRIL) 40 mg tablet Take 40 mg by mouth daily.  rosuvastatin (CRESTOR) 10 mg tablet Take 10 mg by mouth daily.  aspirin delayed-release 81 mg tablet Take 81 mg by mouth daily.  multivitamin (ONE A DAY) tablet Take 1 Tablet by mouth daily.  omega 3-DHA-EPA-fish oil 1,000 mg (120 mg-180 mg) capsule Take 1 Capsule by mouth daily.  cholecalciferol, vitamin D3, (Vitamin D3) 50 mcg (2,000 unit) tab Take 2,000 Units by mouth daily.  calcium carbonate (OS-DAYSI) 500 mg calcium (1,250 mg) tablet Take 1 Tablet by mouth daily as needed for Indigestion. Care Plan discussed with: Patient/Family    Total time spent with patient and review of records: 30 minutes.     Tigre Mccoy MD

## 2021-09-01 VITALS
HEIGHT: 67 IN | OXYGEN SATURATION: 97 % | DIASTOLIC BLOOD PRESSURE: 75 MMHG | RESPIRATION RATE: 18 BRPM | TEMPERATURE: 98.1 F | SYSTOLIC BLOOD PRESSURE: 139 MMHG | WEIGHT: 203 LBS | BODY MASS INDEX: 31.86 KG/M2 | HEART RATE: 63 BPM

## 2021-09-01 LAB
ALBUMIN SERPL-MCNC: 3 G/DL (ref 3.5–5)
ALBUMIN/GLOB SERPL: 1.3 {RATIO} (ref 1.1–2.2)
ALP SERPL-CCNC: 40 U/L (ref 45–117)
ALT SERPL-CCNC: 20 U/L (ref 12–78)
ANION GAP SERPL CALC-SCNC: 3 MMOL/L (ref 5–15)
AST SERPL-CCNC: 20 U/L (ref 15–37)
BASOPHILS # BLD: 0 K/UL (ref 0–0.1)
BASOPHILS NFR BLD: 1 % (ref 0–1)
BILIRUB SERPL-MCNC: 0.6 MG/DL (ref 0.2–1)
BUN SERPL-MCNC: 17 MG/DL (ref 6–20)
BUN/CREAT SERPL: 22 (ref 12–20)
CALCIUM SERPL-MCNC: 8.1 MG/DL (ref 8.5–10.1)
CHLORIDE SERPL-SCNC: 113 MMOL/L (ref 97–108)
CO2 SERPL-SCNC: 26 MMOL/L (ref 21–32)
CREAT SERPL-MCNC: 0.79 MG/DL (ref 0.7–1.3)
DIFFERENTIAL METHOD BLD: ABNORMAL
EOSINOPHIL # BLD: 0.6 K/UL (ref 0–0.4)
EOSINOPHIL NFR BLD: 8 % (ref 0–7)
ERYTHROCYTE [DISTWIDTH] IN BLOOD BY AUTOMATED COUNT: 13.4 % (ref 11.5–14.5)
GLOBULIN SER CALC-MCNC: 2.4 G/DL (ref 2–4)
GLUCOSE SERPL-MCNC: 81 MG/DL (ref 65–100)
HCT VFR BLD AUTO: 32.2 % (ref 36.6–50.3)
HGB BLD-MCNC: 10.7 G/DL (ref 12.1–17)
IMM GRANULOCYTES # BLD AUTO: 0 K/UL (ref 0–0.04)
IMM GRANULOCYTES NFR BLD AUTO: 0 % (ref 0–0.5)
LYMPHOCYTES # BLD: 1.7 K/UL (ref 0.8–3.5)
LYMPHOCYTES NFR BLD: 21 % (ref 12–49)
MCH RBC QN AUTO: 30.4 PG (ref 26–34)
MCHC RBC AUTO-ENTMCNC: 33.2 G/DL (ref 30–36.5)
MCV RBC AUTO: 91.5 FL (ref 80–99)
MONOCYTES # BLD: 0.6 K/UL (ref 0–1)
MONOCYTES NFR BLD: 7 % (ref 5–13)
NEUTS SEG # BLD: 5.1 K/UL (ref 1.8–8)
NEUTS SEG NFR BLD: 63 % (ref 32–75)
NRBC # BLD: 0 K/UL (ref 0–0.01)
NRBC BLD-RTO: 0 PER 100 WBC
PLATELET # BLD AUTO: 170 K/UL (ref 150–400)
PMV BLD AUTO: 11.1 FL (ref 8.9–12.9)
POTASSIUM SERPL-SCNC: 4 MMOL/L (ref 3.5–5.1)
PROT SERPL-MCNC: 5.4 G/DL (ref 6.4–8.2)
RBC # BLD AUTO: 3.52 M/UL (ref 4.1–5.7)
SODIUM SERPL-SCNC: 142 MMOL/L (ref 136–145)
WBC # BLD AUTO: 8.1 K/UL (ref 4.1–11.1)

## 2021-09-01 PROCEDURE — 80053 COMPREHEN METABOLIC PANEL: CPT

## 2021-09-01 PROCEDURE — 36415 COLL VENOUS BLD VENIPUNCTURE: CPT

## 2021-09-01 PROCEDURE — 74011250636 HC RX REV CODE- 250/636: Performed by: FAMILY MEDICINE

## 2021-09-01 PROCEDURE — 85025 COMPLETE CBC W/AUTO DIFF WBC: CPT

## 2021-09-01 RX ADMIN — SODIUM CHLORIDE 75 ML/HR: 9 INJECTION, SOLUTION INTRAVENOUS at 05:17

## 2021-09-01 RX ADMIN — Medication 10 ML: at 05:17

## 2021-09-01 NOTE — DISCHARGE INSTRUCTIONS
Patient Discharge Instructions    Thuy Moyer / [id number] : 1955    Admitted 2021 Discharged: 2021 6:32 AM     ACUTE DIAGNOSES:  GI bleed [K92.2]    CHRONIC MEDICAL DIAGNOSES:  Problem List as of 2021 Date Reviewed: 2021        Codes Class Noted - Resolved    GI bleed ICD-10-CM: K92.2  ICD-9-CM: 578.9  2021 - Present        Hyperkalemia ICD-10-CM: E87.5  ICD-9-CM: 276.7  2021 - Present        Leukocytosis ICD-10-CM: O76.621  ICD-9-CM: 288.60  2021 - Present        CAD (coronary artery disease) ICD-10-CM: I25.10  ICD-9-CM: 414.00  2021 - Present        HTN (hypertension) ICD-10-CM: I10  ICD-9-CM: 401.9  2021 - Present        Hyperlipidemia ICD-10-CM: E78.5  ICD-9-CM: 272.4  2021 - Present              DISCHARGE MEDICATIONS:         · It is important that you take the medication exactly as they are prescribed. · Keep your medication in the bottles provided by the pharmacist and keep a list of the medication names, dosages, and times to be taken in your wallet. · Do not take other medications without consulting your doctor. DIET:  Regular Diet  ACTIVITY: Activity as tolerated    ADDITIONAL INFORMATION: If you experience any of the following symptoms then please call your primary care physician or return to the emergency room if you cannot get hold of your doctor: Fever, chills, nausea, vomiting, diarrhea, change in mentation, falling, bleeding, shortness of breath. FOLLOW UP CARE:  Dr. Edmon Halsted, DO  you are to call and set up an appointment to see them with in 1 week. Follow-up with specialists at directed by them      Information obtained by :  I understand that if any problems occur once I am at home I am to contact my physician. I understand and acknowledge receipt of the instructions indicated above. Physician's or R.N.'s Signature                                                                  Date/Time                                                                                                                                              Patient or Representative Signature                                                          Date/Time

## 2021-09-01 NOTE — PROGRESS NOTES
Bedside and Verbal shift change report given to SANDRA Finney (oncoming nurse) by Everardo Rivera RN (offgoing nurse). Report included the following information SBAR, Kardex, Intake/Output, MAR, Accordion and Recent Results.

## 2021-09-01 NOTE — PROGRESS NOTES
Discharge orders in    Discharge orders, education, and instructions given to patient and verbalized understanding    Tele removed    IV cannula aseptically removed, tip intact    No pain. No distress. Respirations even and unlabored.  No complaints    Waiting for transport

## 2021-09-01 NOTE — PROGRESS NOTES
9/1/2021  10:45 AM  CM completed assessment w/ pt in person, CM wore mask and goggles at all times. Charted demographics verified, pt lives w/ spouse Arik Macedo (GARIMA) [phone] in 1 story home w/ 4 entry step. At baseline pt is ambulatory, iADLs, drives; denies frequent falls   Reason for Admission:  Emergent for GI Bleed  Pt si a 77 yo  male who [facility] to [facility] ED c/o rectal bleeding   PMHx: Not on file                   RUR Score:      11% Low Risk of Luite [address]to 70 for utilizing home health: No history, pt is not homebound       DME: None  Rx: [facility] [employer] March 2021  PCP: First and Last name:  Nirmal Degroot DO     Name of Practice:    Are you a current patient: Yes/No: Yes   Approximate date of last visit:   30 days   Can you participate in a virtual visit with your PCP: yes                    Current Advanced Directive/Advance Care Plan: Full Code  HCDM spouse (GARIMA) Arik Macedo     Healthcare Decision Maker:   Click here to complete [address] including selection of the Healthcare Decision Maker Relationship (ie \"Primary\")                             Transition of Care Plan:                    RUR 11 %  LOS 3 Days  1. Hospital admission for medical management   2. GI consult  3. Surgery consult  4. CM to follow through for treatment/resposne  5. DC when medically stable to home  6. Outpatient f/u PCP  7. [facility] resources  8. Spouse will transport    Care Management Interventions  PCP Verified by CM:  Yes (Shirin Gunn DO, last visit > 30 days)  Palliative Care Criteria Met (RRAT>21 & CHF Dx)?: No  Mode of Transport at Discharge: Self (spouse)  Physical Therapy Consult: No  Occupational Therapy Consult: No  Speech Therapy Consult: No  Current Support Network: Lives with Spouse, Own Home (pt lives w/ spouse in pvt residence, at baseline pt is ambulatory, iADLS, drives )  Confirm Follow Up Transport: Self  Discharge Location  Discharge Placement: Home with outpatient services  CHING Black

## 2021-09-01 NOTE — PROGRESS NOTES
Daily Progress Note: 9/1/2021  Sherman Strange DO    Assessment/Plan:   Hematochezia. -CT of the abdomen is unremarkable. -Patient was transfused 2 units of packed RBC and 1 FFP in ER   -GI and surgery consult   -Colonoscopy and UGI neg for bleeding    Hyperkalemia (8/29/2021). -Restart lisinopril and monitor blood chemistry     Leukocytosis (8/29/2021). Doubt infection.    -Likely secondary to stress due to his GI bleed. -Monitor       CAD (coronary artery disease) (8/29/2021). -Hold aspirin     HTN (hypertension) (8/29/2021). -Restart Lisinopril     Hyperlipidemia (8/29/2021). -Resume Crestor               Problem List:  Problem List as of 9/1/2021 Date Reviewed: 8/29/2021        Codes Class Noted - Resolved    GI bleed ICD-10-CM: K92.2  ICD-9-CM: 578.9  8/29/2021 - Present        Hyperkalemia ICD-10-CM: E87.5  ICD-9-CM: 276.7  8/29/2021 - Present        Leukocytosis ICD-10-CM: C84.685  ICD-9-CM: 288.60  8/29/2021 - Present        CAD (coronary artery disease) ICD-10-CM: I25.10  ICD-9-CM: 414.00  8/29/2021 - Present        HTN (hypertension) ICD-10-CM: I10  ICD-9-CM: 401.9  8/29/2021 - Present        Hyperlipidemia ICD-10-CM: E78.5  ICD-9-CM: 272.4  8/29/2021 - Present              HPI:   Mr. Cristo Marques is a 78 y.o.  male who is admitted with hematochezia. Mr. Cristo Marques with past medical history of CAD, hypertension, hyperlipidemia presented to ER complaining of rectal bleeding which started today. Has about 6 times rectal bleeding with bright red blood and patient became weak. On arrival his blood pressure was low and started blood transfusion and FFP. Patient received 2 units of packed RBC and 1 unit of FFP. Patient denies rectal pain or abdominal pain, rectal bleeding is painless. Never had similar symptoms in the past.  Had normal colonoscopy about 4 years ago at [facility]. (Dr Argueta [place])    8/30:  Had another bloody stool this am. Dizzy with standing.  BP stable at this time. No abd pain. No NSAID use. Had gastric bypass years ago. Taking supplements. : For colonoscopy today. No complaints this am.     : Colonoscopy with polyps but no bleeding noted. GI series neg. Tolerating diet. Had BM and no brayan bleeding. Hb 10.7. He would like to go home. Advised if he has any bleeding at home will need to return to the hospital and will need CTA Abd. Review of Systems:   A comprehensive review of systems was negative except for that written in the HPI. Objective:   Physical Exam:     Visit Vitals  /80 (BP 1 Location: Right upper arm, BP Patient Position: At rest)   Pulse 70   Temp 98.3 °F (36.8 °C)   Resp 18   Ht 5' 7\" (1.702 m)   Wt 203 lb (92.1 kg)   SpO2 95%   BMI 31.79 kg/m²      O2 Device: None (Room air)    Temp (24hrs), Av.3 °F (36.8 °C), Min:98 °F (36.7 °C), Max:98.6 °F (37 °C)    1901 -  0700  In: 240 [P.O.:240]  Out: 0    701 - 1900  In: 3663 [P.O.:240; I.V.:1300]  Out: -     General:  Alert, cooperative, no distress, appears stated age. Head:  Normocephalic, without obvious abnormality, atraumatic. Eyes:  Conjunctivae/corneas clear. Nose: Nares normal. Septum midline. Mucosa normal. No drainage or sinus tenderness. Throat: Lips, mucosa, and tongue normal.    Neck: Supple, symmetrical, trachea midline, no adenopathy, thyroid: no enlargement/tenderness/nodules, no carotid bruit and no JVD. Back:   Symmetric, no curvature. ROM normal. No CVA tenderness. Lungs:   Clear to auscultation bilaterally. Chest wall:  No tenderness or deformity. Heart:  Regular rate and rhythm, S1, S2 normal, no murmur, click, rub or gallop. Abdomen:   Soft, non-tender. Bowel sounds normal. No masses,  No organomegaly. Extremities: Extremities normal, atraumatic, no cyanosis or edema. No calf tenderness or cords. Pulses: 2+ and symmetric all extremities.    Skin: Skin color, texture, turgor normal. No rashes or lesions Neurologic: CNII-XII intact. Alert and oriented X 3. Fine motor of hands and fingers normal.   equal.  No cogwheeling or rigidity. Gait not tested at this time. Sensation grossly normal to touch. Gross motor of extremities normal.       Data Review:   CT Abd/Pelvis 8/29/21       FINDINGS:   LOWER THORAX: Severe coronary artery calcific atherosclerosis. LIVER: No mass. BILIARY TREE: Status post cholecystectomy. Mild CBD dilatation. SPLEEN: Calcified splenic granulomas  PANCREAS: No mass or ductal dilatation. ADRENALS: Left adrenal myelolipoma measures 3.8 cm. KIDNEYS: No mass, calculus, or hydronephrosis. STOMACH: Evidence of gastric bypass procedure. SMALL BOWEL: No dilatation or wall thickening. COLON: No dilatation or wall thickening. APPENDIX: Normal  PERITONEUM: No ascites or pneumoperitoneum. RETROPERITONEUM: No lymphadenopathy or aortic aneurysm. REPRODUCTIVE ORGANS: Prostate is noted  URINARY BLADDER: No mass or calculus. BONES: No destructive bone lesion. ABDOMINAL WALL: No mass or hernia. ADDITIONAL COMMENTS: N/A     IMPRESSION  No acute abdominal or pelvic pathology. Evidence of gastric bypass procedure. No  bowel obstruction or perforation. Severe calcific coronary artery  Atherosclerosis. FINDINGS: UGI 8/31/21  The preliminary radiograph of the abdomen demonstrates a nonobstructive gas  pattern.     A biphasic examination was performed. The patient ingested effervescent granules  followed by barium without difficulty.      The esophagus is normal in caliber and contour with no mass, constricting lesion  or mucosal abnormality. The esophageal motility is normal.  There is no hiatal  hernia or gastroesophageal reflux. .     Patient status post gastric bypass. Contrast flows quickly through the gastric  pouch into the jejunal limb.  The jejunal limb is mildly dilated to the level of  the jejunal anastomosis however no evidence of obstruction is noted.     At 30 minutes contrast is noted in the colon. Spot imaging demonstrates no small  bowel abnormality.     IMPRESSION  Status post gastric bypass. Mild distention of the jejunal limb  without evidence of obstruction. Otherwise unremarkable study.     Recent Days:  Recent Labs     08/31/21  0512 08/30/21  2249 08/30/21  1232 08/30/21  0033 08/30/21  0033 08/29/21  2136 08/29/21 2136   WBC  --   --   --   --  16.7*  --  17.0*   HGB 12.0* 12.2 12.5   < > 13.1   < > 13.5   HCT 35.4* 36.0* 36.1*   < > 38.3   < > 40.2   PLT  --   --   --   --  211  --  303    < > = values in this interval not displayed. Recent Labs     08/30/21 0033 08/29/21 2141 08/29/21 2136     --  140   K 5.6*  --  5.5*   *  --  111*   CO2 24  --  23   *  --  143*   BUN 34*  --  33*   CREA 0.76  --  1.17   CA 7.7*  --  7.8*   ALB  --   --  3.2*   TBILI  --   --  0.7   ALT  --   --  21   INR  --  1.1  --      No results for input(s): PH, PCO2, PO2, HCO3, FIO2 in the last 72 hours. 24 Hour Results:  Recent Results (from the past 24 hour(s))   HGB & HCT    Collection Time: 08/31/21  5:12 AM   Result Value Ref Range    HGB 12.0 (L) 12.1 - 17.0 g/dL    HCT 35.4 (L) 36.6 - 50.3 %       Medications reviewed  Current Facility-Administered Medications   Medication Dose Route Frequency    lisinopriL (PRINIVIL, ZESTRIL) tablet 40 mg  40 mg Oral DAILY    rosuvastatin (CRESTOR) tablet 10 mg  10 mg Oral DAILY    sodium chloride (NS) flush 5-40 mL  5-40 mL IntraVENous Q8H    sodium chloride (NS) flush 5-40 mL  5-40 mL IntraVENous PRN    0.9% sodium chloride infusion  75 mL/hr IntraVENous CONTINUOUS    0.9% sodium chloride infusion 250 mL  250 mL IntraVENous PRN    0.9% sodium chloride infusion 250 mL  250 mL IntraVENous PRN       Care Plan discussed with: Patient/Family    Total time spent with patient and review of records: 30 minutes.     Lance Ramos MD

## 2021-09-01 NOTE — DISCHARGE SUMMARY
Physician Discharge Summary     Patient ID:    Tash Farfan  [id number]  78 y.o.  1955  Erma Lopez DO    Admit date: 8/29/2021  Discharge date and time: 9/1/2021  Admission Diagnoses: GI bleed [K92.2]  Discharge Medications:   Current Discharge Medication List      CONTINUE these medications which have NOT CHANGED    Details   lisinopriL (PRINIVIL, ZESTRIL) 40 mg tablet Take 40 mg by mouth daily. rosuvastatin (CRESTOR) 10 mg tablet Take 10 mg by mouth daily. multivitamin (ONE A DAY) tablet Take 1 Tablet by mouth daily. omega 3-DHA-EPA-fish oil 1,000 mg (120 mg-180 mg) capsule Take 1 Capsule by mouth daily. cholecalciferol, vitamin D3, (Vitamin D3) 50 mcg (2,000 unit) tab Take 2,000 Units by mouth daily. calcium carbonate (OS-DAYSI) 500 mg calcium (1,250 mg) tablet Take 1 Tablet by mouth daily as needed for Indigestion. STOP taking these medications       aspirin delayed-release 81 mg tablet Comments:   Reason for Stopping: Follow up Care:    1. Michael Crump DO with in 1 weeks  2. specialists as directed. Diet:  Regular Diet  Disposition:  Home. Advanced Directive:  Discharge Exam:  [See today's progress note.]  CONSULTATIONS: GI    Significant Diagnostic Studies:   Recent Labs     09/01/21 0526 08/31/21  0512 08/30/21  1232 08/30/21  0033   WBC 8.1  --   --  16.7*   HGB 10.7* 12.0*   < > 13.1   HCT 32.2* 35.4*   < > 38.3     --   --  211    < > = values in this interval not displayed.      Recent Labs     09/01/21  0526 08/30/21  0033 08/29/21  2136    142 140   K 4.0 5.6* 5.5*   * 114* 111*   CO2 26 24 23   BUN 17 34* 33*   CREA 0.79 0.76 1.17   GLU 81 104* 143*   CA 8.1* 7.7* 7.8*     Recent Labs     09/01/21  0526 08/29/21  2136   ALT 20 21   AP 40* 55   TBILI 0.6 0.7   TP 5.4* 6.0*   ALB 3.0* 3.2*   GLOB 2.4 2.8     Recent Labs     08/29/21  2141   INR 1.1   PTP 11.7*          HOSPITAL COURSE & TREATMENT RENDERED: Hematochezia.     -CT of the abdomen is unremarkable.    -Patient was transfused 2 units of packed RBC and 1 FFP in ER   -GI and surgery consult   -Colonoscopy and UGI neg for bleeding     Hyperkalemia (8/29/2021).     -Restart lisinopril and monitor blood chemistry      Leukocytosis (8/29/2021).   Doubt infection.    -Likely secondary to stress due to his GI bleed.    -Monitor       CAD (coronary artery disease) (8/29/2021). -Hold aspirin     HTN (hypertension) (8/29/2021).     -Restart Lisinopril     Hyperlipidemia (8/29/2021).     -Resume Crestor                   HPI:   Mr. Valadez is a 78 y.o.   male who is admitted with hematochezia.  Mr. Valadez with past medical history of CAD, hypertension, hyperlipidemia presented to ER complaining of rectal bleeding which started today.  Has about 6 times rectal bleeding with bright red blood and patient became weak.  On arrival his blood pressure was low and started blood transfusion and FFP.  Patient received 2 units of packed RBC and 1 unit of FFP.  Patient denies rectal pain or abdominal pain, rectal bleeding is painless.  Never had similar symptoms in the past.  Had normal colonoscopy about 4 years ago at [place]. (Dr Semaj Doss)     8/30:  Had another bloody stool this am. Dizzy with standing. BP stable at this time. No abd pain. No NSAID use. Had gastric bypass years ago. Taking supplements.      8/31: For colonoscopy today. No complaints this am.      9/1: Colonoscopy with polyps but no bleeding noted. GI series neg. Tolerating diet. Had BM and no brayan bleeding. Hb 10.7. He would like to go home.  Advised if he has any bleeding at home will need to return to the hospital and will need CTA Abd.      Review of Systems:   A comprehensive review of systems was negative except for that written in the HPI.     Objective:   Physical Exam:      Visit Vitals  /80 (BP 1 Location: Right upper arm, BP Patient Position: At rest)   Pulse 70   Temp 98.3 °F (36.8 °C) Resp 18   Ht 5' 7\" (1.702 m)   Wt 203 lb (92.1 kg)   SpO2 95%   BMI 31.79 kg/m²      O2 Device: None (Room air)     Temp (24hrs), Av.3 °F (36.8 °C), Min:98 °F (36.7 °C), Max:98.6 °F (37 °C)    1901 -  07  In: 240 [P.O.:240]  Out: 0    701 - 1900  In: 9080 [P.O.:240; I.V.:1300]  Out: -      General:  Alert, cooperative, no distress, appears stated age. Head:  Normocephalic, without obvious abnormality, atraumatic. Eyes:  Conjunctivae/corneas clear. Nose: Nares normal. Septum midline. Mucosa normal. No drainage or sinus tenderness. Throat: Lips, mucosa, and tongue normal.    Neck: Supple, symmetrical, trachea midline, no adenopathy, thyroid: no enlargement/tenderness/nodules, no carotid bruit and no JVD. Back:   Symmetric, no curvature. ROM normal. No CVA tenderness. Lungs:   Clear to auscultation bilaterally. Chest wall:  No tenderness or deformity. Heart:  Regular rate and rhythm, S1, S2 normal, no murmur, click, rub or gallop. Abdomen:   Soft, non-tender. Bowel sounds normal. No masses,  No organomegaly. Extremities: Extremities normal, atraumatic, no cyanosis or edema. No calf tenderness or cords. Pulses: 2+ and symmetric all extremities. Skin: Skin color, texture, turgor normal. No rashes or lesions   Neurologic: CNII-XII intact. Alert and oriented X 3. Fine motor of hands and fingers normal.   equal.  No cogwheeling or rigidity. Gait not tested at this time. Sensation grossly normal to touch. Gross motor of extremities normal.        Data Review:   CT Abd/Pelvis 21       FINDINGS:   LOWER THORAX: Severe coronary artery calcific atherosclerosis. LIVER: No mass. BILIARY TREE: Status post cholecystectomy. Mild CBD dilatation. SPLEEN: Calcified splenic granulomas  PANCREAS: No mass or ductal dilatation. ADRENALS: Left adrenal myelolipoma measures 3.8 cm. KIDNEYS: No mass, calculus, or hydronephrosis.   STOMACH: Evidence of gastric bypass procedure. SMALL BOWEL: No dilatation or wall thickening. COLON: No dilatation or wall thickening. APPENDIX: Normal  PERITONEUM: No ascites or pneumoperitoneum. RETROPERITONEUM: No lymphadenopathy or aortic aneurysm. REPRODUCTIVE ORGANS: Prostate is noted  URINARY BLADDER: No mass or calculus. BONES: No destructive bone lesion. ABDOMINAL WALL: No mass or hernia. ADDITIONAL COMMENTS: N/A     IMPRESSION  No acute abdominal or pelvic pathology. Evidence of gastric bypass procedure. No  bowel obstruction or perforation. Severe calcific coronary artery  Atherosclerosis.     FINDINGS: UGI 8/31/21  The preliminary radiograph of the abdomen demonstrates a nonobstructive gas  pattern.     A biphasic examination was performed. The patient ingested effervescent granules  followed by barium without difficulty.      The esophagus is normal in caliber and contour with no mass, constricting lesion  or mucosal abnormality. The esophageal motility is normal.  There is no hiatal  hernia or gastroesophageal reflux. .     Patient status post gastric bypass. Contrast flows quickly through the gastric  pouch into the jejunal limb. The jejunal limb is mildly dilated to the level of  the jejunal anastomosis however no evidence of obstruction is noted.     At 30 minutes contrast is noted in the colon. Spot imaging demonstrates no small  bowel abnormality.     IMPRESSION  Status post gastric bypass. Mild distention of the jejunal limb  without evidence of obstruction.  Otherwise unremarkable study.           Signed:  Edmundo Powell MD  9/1/2021  6:32 AM

## 2021-09-02 LAB
ABO + RH BLD: NORMAL
BLD PROD TYP BPU: NORMAL
BLOOD GROUP ANTIBODIES SERPL: NORMAL
BPU ID: NORMAL
CROSSMATCH RESULT,%XM: NORMAL
SPECIMEN EXP DATE BLD: NORMAL
STATUS OF UNIT,%ST: NORMAL
UNIT DIVISION, %UDIV: 0

## 2021-09-05 NOTE — PROGRESS NOTES
Physician Progress Note      PATIENTBob Warren  CSN #:                  [id number]  :                       1955  ADMIT DATE:       2021 9:15 PM  [address] [place] DATE:        2021 1:54 PM  RESPONDING  PROVIDER #:        Zo Maki MD          QUERY TEXT:    Dear Dr. Carroll Mcardle,  Patient admitted with GI bleeding, noted to have diverticulosis and adenomatous polyp of colon. If possible, please document in progress notes and discharge summary the cause of the GI bleeding: The medical record reflects the following:  Risk Factors: 78 yr. old male admitted with GI bleeding, BRBFR. Clinical Indicators:  Colonoscopy report; Adenomatous polyp of colon, unspecified part of colon, Diverticulosis of large intestine without diverticulitis, Diverticulosis; no inflammation; no blood seen anywhere on this exam, Five sessile polyps, multiple locations, none larger than 10 mm. HGB 13.1 on arrival and on discharge was 10.7. Treatment: Lab work, blood transfusion 2 units PRBC and 1 unit FFP, IV fluids, GI and surgery consult, CT scan of abdomen colonoscopy and UGI, monitoring and evaluation  Options provided:  -- GI bleeding due to Adenomatous polyp of colon  -- GI bleeding due to Diverticulosis  -- Other - I will add my own diagnosis  -- Disagree - Not applicable / Not valid  -- Disagree - Clinically unable to determine / Unknown  -- Refer to Clinical Documentation Reviewer    PROVIDER RESPONSE TEXT:    Provider is clinically unable to determine a response to this query.     Query created by: Yfn Dodson on 9/3/2021 12:38 PM      Electronically signed by:  Zo Maki MD 2021 7:20 PM

## 2022-03-18 PROBLEM — E87.5 HYPERKALEMIA: Status: ACTIVE | Noted: 2021-08-29

## 2022-03-18 PROBLEM — I25.10 CAD (CORONARY ARTERY DISEASE): Status: ACTIVE | Noted: 2021-08-29

## 2022-03-18 PROBLEM — E78.5 HYPERLIPIDEMIA: Status: ACTIVE | Noted: 2021-08-29

## 2022-03-19 PROBLEM — K92.2 GI BLEED: Status: ACTIVE | Noted: 2021-08-29

## 2022-03-19 PROBLEM — I10 HTN (HYPERTENSION): Status: ACTIVE | Noted: 2021-08-29

## 2022-03-19 PROBLEM — D72.829 LEUKOCYTOSIS: Status: ACTIVE | Noted: 2021-08-29

## 2023-05-14 RX ORDER — ROSUVASTATIN CALCIUM 10 MG/1
10 TABLET, COATED ORAL DAILY
COMMUNITY

## 2023-05-14 RX ORDER — IBUPROFEN 200 MG
1 CAPSULE ORAL DAILY PRN
COMMUNITY

## 2023-05-14 RX ORDER — LISINOPRIL 40 MG/1
40 TABLET ORAL DAILY
COMMUNITY

## 2024-03-16 ENCOUNTER — HOSPITAL ENCOUNTER (OUTPATIENT)
Facility: HOSPITAL | Age: 69
End: 2024-03-16
Payer: MEDICARE

## 2024-03-16 VITALS — BODY MASS INDEX: 31.08 KG/M2 | HEIGHT: 67 IN | WEIGHT: 198 LBS

## 2024-03-16 DIAGNOSIS — M54.42 ACUTE LEFT-SIDED LOW BACK PAIN WITH LEFT-SIDED SCIATICA: ICD-10-CM

## 2024-03-16 DIAGNOSIS — M79.18 LEFT BUTTOCK PAIN: ICD-10-CM

## 2024-03-16 DIAGNOSIS — M43.16 SPONDYLOLISTHESIS AT L4-L5 LEVEL: ICD-10-CM

## 2024-03-16 PROCEDURE — 72148 MRI LUMBAR SPINE W/O DYE: CPT

## 2025-02-10 ENCOUNTER — OFFICE VISIT (OUTPATIENT)
Age: 70
End: 2025-02-10
Payer: MEDICARE

## 2025-02-10 VITALS
WEIGHT: 186.7 LBS | OXYGEN SATURATION: 98 % | HEART RATE: 87 BPM | DIASTOLIC BLOOD PRESSURE: 80 MMHG | SYSTOLIC BLOOD PRESSURE: 138 MMHG | HEIGHT: 67 IN | BODY MASS INDEX: 29.3 KG/M2

## 2025-02-10 DIAGNOSIS — H81.10 BENIGN PAROXYSMAL POSITIONAL VERTIGO, UNSPECIFIED LATERALITY: ICD-10-CM

## 2025-02-10 DIAGNOSIS — G25.0 BENIGN ESSENTIAL TREMOR: Primary | ICD-10-CM

## 2025-02-10 PROCEDURE — 1159F MED LIST DOCD IN RCRD: CPT | Performed by: PSYCHIATRY & NEUROLOGY

## 2025-02-10 PROCEDURE — G8427 DOCREV CUR MEDS BY ELIG CLIN: HCPCS | Performed by: PSYCHIATRY & NEUROLOGY

## 2025-02-10 PROCEDURE — 4004F PT TOBACCO SCREEN RCVD TLK: CPT | Performed by: PSYCHIATRY & NEUROLOGY

## 2025-02-10 PROCEDURE — 1123F ACP DISCUSS/DSCN MKR DOCD: CPT | Performed by: PSYCHIATRY & NEUROLOGY

## 2025-02-10 PROCEDURE — 99204 OFFICE O/P NEW MOD 45 MIN: CPT | Performed by: PSYCHIATRY & NEUROLOGY

## 2025-02-10 PROCEDURE — 3017F COLORECTAL CA SCREEN DOC REV: CPT | Performed by: PSYCHIATRY & NEUROLOGY

## 2025-02-10 PROCEDURE — 1126F AMNT PAIN NOTED NONE PRSNT: CPT | Performed by: PSYCHIATRY & NEUROLOGY

## 2025-02-10 PROCEDURE — 3079F DIAST BP 80-89 MM HG: CPT | Performed by: PSYCHIATRY & NEUROLOGY

## 2025-02-10 PROCEDURE — 3075F SYST BP GE 130 - 139MM HG: CPT | Performed by: PSYCHIATRY & NEUROLOGY

## 2025-02-10 PROCEDURE — G8419 CALC BMI OUT NRM PARAM NOF/U: HCPCS | Performed by: PSYCHIATRY & NEUROLOGY

## 2025-02-10 RX ORDER — TRAZODONE HYDROCHLORIDE 100 MG/1
100 TABLET ORAL NIGHTLY
COMMUNITY
Start: 2025-01-14

## 2025-02-10 RX ORDER — ASPIRIN 81 MG/1
81 TABLET, CHEWABLE ORAL DAILY
COMMUNITY
Start: 2022-01-01

## 2025-02-10 RX ORDER — LOSARTAN POTASSIUM 100 MG/1
100 TABLET ORAL DAILY
COMMUNITY
Start: 2024-12-26

## 2025-02-10 RX ORDER — PROPRANOLOL HYDROCHLORIDE 60 MG/1
60 CAPSULE, EXTENDED RELEASE ORAL DAILY
Qty: 90 CAPSULE | Refills: 1 | Status: SHIPPED | OUTPATIENT
Start: 2025-02-10

## 2025-02-10 RX ORDER — QUETIAPINE FUMARATE 25 MG/1
50 TABLET, FILM COATED ORAL NIGHTLY
COMMUNITY
Start: 2025-01-14

## 2025-02-10 RX ORDER — AMLODIPINE BESYLATE 10 MG/1
10 TABLET ORAL DAILY
COMMUNITY
Start: 2024-11-27

## 2025-02-10 RX ORDER — BUPROPION HYDROCHLORIDE 150 MG/1
150 TABLET, EXTENDED RELEASE ORAL 2 TIMES DAILY
COMMUNITY

## 2025-02-10 ASSESSMENT — PATIENT HEALTH QUESTIONNAIRE - PHQ9
1. LITTLE INTEREST OR PLEASURE IN DOING THINGS: SEVERAL DAYS
2. FEELING DOWN, DEPRESSED OR HOPELESS: SEVERAL DAYS
SUM OF ALL RESPONSES TO PHQ9 QUESTIONS 1 & 2: 2
SUM OF ALL RESPONSES TO PHQ QUESTIONS 1-9: 2

## 2025-02-10 NOTE — PROGRESS NOTES
Neurologist  Diplomate, American Board of Psychiatry & Neurology       CC Referring provider:    Maynor Miller MD

## 2025-03-04 ENCOUNTER — HOSPITAL ENCOUNTER (OUTPATIENT)
Facility: HOSPITAL | Age: 70
Setting detail: RECURRING SERIES
Discharge: HOME | End: 2025-03-07
Attending: PSYCHIATRY & NEUROLOGY
Payer: MEDICARE

## 2025-03-04 PROCEDURE — 97110 THERAPEUTIC EXERCISES: CPT | Performed by: PHYSICAL THERAPIST

## 2025-03-04 PROCEDURE — 97162 PT EVAL MOD COMPLEX 30 MIN: CPT | Performed by: PHYSICAL THERAPIST

## 2025-03-04 NOTE — THERAPY EVALUATION
Physical Therapy at [facility],   a part of [facility]  [address] [address]  Phone:[phone] Fax:[phone]                                                                            PHYSICAL THERAPY - MEDICARE EVALUATION/PLAN OF CARE NOTE (updated 3/23)      Date: 3/4/2025          Patient Name:  David Hull :  1955   Medical   Diagnosis:  Benign paroxysmal positional vertigo, unspecified laterality [H81.10] Treatment Diagnosis:  H81.12  Benign paroxysmal vertigo, left ear and R26.81   Unsteadiness on feet    Referral Source:  Emmanuelle Kelley, * Provider #:  [id number]                  Insurance: Payor: MEDICARE / Plan: MEDICARE PART A AND B / Product Type: *No Product type* /      Patient  verified yes     Visit #   Current  / Total 1 MN   Time   In / Out 11:00 AM 11:55 AM   Total Treatment Time 55   Total Timed Codes 10   1:1 Treatment Time 10      [facility] Totals Reminder:  bill using total billable   min of TIMED therapeutic procedures and modalities.   8-22 min = 1 unit; 23-37 min = 2 units; 38-52 min = 3 units;  53-67 min = 4 units; 68-82 min = 5 units           SUBJECTIVE  Pain Level (0-10 scale): 0  []constant []intermittent []improving []worsening []no change since onset    Any medication changes, allergies to medications, adverse drug reactions, diagnosis change, or new procedure performed?: [x] No    [] Yes (see summary sheet for update)  Medications: Verified on Patient Summary List    Subjective functional status/changes:     Patient has had an ~1 year hx of vertigo experienced when rolling to left side in bed or when looking up. He also feels imbalanced when walking, and has decreased his walking/activity level significantly in the past few years.     Start of Care: 3/4/2025  Onset Date: ~3/2024  Current symptoms/Complaints: Vertigo, imbalance  Mechanism of Injury: Insidious  PLOF: Independent, used to walk up to 5mi per

## 2025-03-11 ENCOUNTER — HOSPITAL ENCOUNTER (OUTPATIENT)
Facility: HOSPITAL | Age: 70
Setting detail: RECURRING SERIES
Discharge: HOME | End: 2025-03-14
Attending: PSYCHIATRY & NEUROLOGY
Payer: MEDICARE

## 2025-03-11 PROCEDURE — 95992 CANALITH REPOSITIONING PROC: CPT | Performed by: PHYSICAL THERAPIST

## 2025-03-11 PROCEDURE — 97112 NEUROMUSCULAR REEDUCATION: CPT | Performed by: PHYSICAL THERAPIST

## 2025-03-11 NOTE — PROGRESS NOTES
PHYSICAL THERAPY - MEDICARE DAILY TREATMENT NOTE (updated 3/23)      Date: 3/11/2025          Patient Name:  David Hull :  1955   Medical   Diagnosis:  Benign paroxysmal positional vertigo, unspecified laterality [H81.10] Treatment Diagnosis:  H81.12  Benign paroxysmal vertigo, left ear and R26.81   Unsteadiness on feet    Referral Source:  AllieEmmanuelle Javoncharlene, * Insurance:   Payor: MEDICARE / Plan: MEDICARE PART A AND B / Product Type: *No Product type* /                     Patient  verified yes     Visit #   Current  / Total 2 MN   Time   In / Out 9:30 AM 10:05 AM   Total Treatment Time 35   Total Timed Codes 25   1:1 Treatment Time 18      [facility] Totals Reminder:  bill using total billable   min of TIMED therapeutic procedures and modalities.   8-22 min = 1 unit; 23-37 min = 2 units; 38-52 min = 3 units; 53-67 min = 4 units; 68-82 min = 5 units            SUBJECTIVE    Pain Level (0-10 scale): 0    Any medication changes, allergies to medications, adverse drug reactions, diagnosis change, or new procedure performed?: [x] No    [] Yes (see summary sheet for update)  Medications: Verified on Patient Summary List    Subjective functional status/changes:     Patient reports dizziness is about the same, but was somewhat better turning in bed yesterday. He has been doing well with HEP, feels challenged by feet together/eyes closed. He bought a walking stick and feels it has been helpful for increasing gait speed and distance -- walked 2 miles two days last week.     OBJECTIVE      Therapeutic Procedures:  Tx Min Billable or 1:1 Min (if diff from Tx Min) Procedure, Rationale, Specifics   25 18 11577 Neuromuscular Re-Education (timed):  improve balance, coordination, kinesthetic sense, posture, core stability and proprioception to improve patient's ability to develop conscious control of individual muscles and awareness of position of extremities in order to progress to PLOF and address remaining

## 2025-03-13 ENCOUNTER — HOSPITAL ENCOUNTER (OUTPATIENT)
Facility: HOSPITAL | Age: 70
Setting detail: RECURRING SERIES
Discharge: HOME | End: 2025-03-16
Attending: PSYCHIATRY & NEUROLOGY
Payer: MEDICARE

## 2025-03-13 PROCEDURE — 97112 NEUROMUSCULAR REEDUCATION: CPT | Performed by: PHYSICAL THERAPIST

## 2025-03-13 PROCEDURE — 95992 CANALITH REPOSITIONING PROC: CPT | Performed by: PHYSICAL THERAPIST

## 2025-03-13 NOTE — PROGRESS NOTES
PHYSICAL THERAPY - MEDICARE DAILY TREATMENT NOTE (updated 3/23)      Date: 3/13/2025          Patient Name:  David Hull :  1955   Medical   Diagnosis:  Benign paroxysmal positional vertigo, unspecified laterality [H81.10] Treatment Diagnosis:  H81.12  Benign paroxysmal vertigo, left ear and R26.81   Unsteadiness on feet    Referral Source:  Allie Emmanuelle Ledy, * Insurance:   Payor: MEDICARE / Plan: MEDICARE PART A AND B / Product Type: *No Product type* /                     Patient  verified yes     Visit #   Current  / Total 3 MN   Time   In / Out 9:58 AM 10:35 AM   Total Treatment Time 37   Total Timed Codes 30   1:1 Treatment Time 30      [facility] Totals Reminder:  bill using total billable   min of TIMED therapeutic procedures and modalities.   8-22 min = 1 unit; 23-37 min = 2 units; 38-52 min = 3 units; 53-67 min = 4 units; 68-82 min = 5 units            SUBJECTIVE    Pain Level (0-10 scale): 0    Any medication changes, allergies to medications, adverse drug reactions, diagnosis change, or new procedure performed?: [x] No    [] Yes (see summary sheet for update)  Medications: Verified on Patient Summary List    Subjective functional status/changes:     Patient reports he felt good following previous visit and even woke up on his L side without any dizziness. Yesterday at a bookstore though he was unable to look up or down to read titles.     OBJECTIVE      Therapeutic Procedures:  Tx Min Billable or 1:1 Min (if diff from Tx Min) Procedure, Rationale, Specifics   30 30 [id number] Neuromuscular Re-Education (timed):  improve balance, coordination, kinesthetic sense, posture, core stability and proprioception to improve patient's ability to develop conscious control of individual muscles and awareness of position of extremities in order to progress to PLOF and address remaining functional goals. (see flow sheet as applicable)     Details if applicable:     30 30    Total Total             7       min

## 2025-03-18 ENCOUNTER — HOSPITAL ENCOUNTER (OUTPATIENT)
Facility: HOSPITAL | Age: 70
Setting detail: RECURRING SERIES
Discharge: HOME | End: 2025-03-21
Attending: PSYCHIATRY & NEUROLOGY
Payer: MEDICARE

## 2025-03-18 PROCEDURE — 95992 CANALITH REPOSITIONING PROC: CPT | Performed by: PHYSICAL THERAPIST

## 2025-03-18 PROCEDURE — 97112 NEUROMUSCULAR REEDUCATION: CPT | Performed by: PHYSICAL THERAPIST

## 2025-03-18 NOTE — PROGRESS NOTES
vertigo, decrease dizziness, improve balance to improve patient's ability to progress to PLOF and address remaining functional goals.       Procedure Performed: 1 L Epley w/ head tapping          [x]  Patient Education billed concurrently with other procedures   [x] Review HEP    [] Progressed/Changed HEP, detail:    [] Other detail:         Other Objective/Functional Measures  + L Brandi-Hallpike    Pain Level at end of session (0-10 scale): 0      Assessment   Patient tolerated treatment well with no lingering dizziness following Epley maneuver. Will retest on Thursday. Pt did well with balance activities despite stronger tremors this morning, and was re-educated on importance of gait speed for balance.     Patient will continue to benefit from skilled PT / OT services to modify and progress therapeutic interventions, analyze and address functional mobility deficits, analyze and address strength deficits, analyze and cue for proper movement patterns, and analyze and address imbalance/dizziness to address functional deficits and attain remaining goals.    Progress toward goals / Updated goals:  []  See Progress Note/Recertification    NT      PLAN  Yes  Continue plan of care  Re-Cert Due: 24 visits  []  Upgrade activities as tolerated  []  Discharge due to:  []  Other:      Angelita Aggarwal, PT       3/18/2025       9:43 AM

## 2025-03-20 ENCOUNTER — HOSPITAL ENCOUNTER (OUTPATIENT)
Facility: HOSPITAL | Age: 70
Setting detail: RECURRING SERIES
Discharge: HOME | End: 2025-03-23
Attending: PSYCHIATRY & NEUROLOGY
Payer: MEDICARE

## 2025-03-20 PROCEDURE — 97112 NEUROMUSCULAR REEDUCATION: CPT | Performed by: PHYSICAL THERAPIST

## 2025-03-20 PROCEDURE — 95992 CANALITH REPOSITIONING PROC: CPT | Performed by: PHYSICAL THERAPIST

## 2025-03-20 NOTE — PROGRESS NOTES
PHYSICAL THERAPY - MEDICARE DAILY TREATMENT NOTE (updated 3/23)      Date: 3/20/2025          Patient Name:  David Hull :  1955   Medical   Diagnosis:  Benign paroxysmal positional vertigo, unspecified laterality [H81.10] Treatment Diagnosis:  H81.12  Benign paroxysmal vertigo, left ear and R26.81   Unsteadiness on feet    Referral Source:  KelleyEmmanuelle, * Insurance:   Payor: MEDICARE / Plan: MEDICARE PART A AND B / Product Type: *No Product type* /                     Patient  verified yes     Visit #   Current  / Total 5 MN   Time   In / Out 1002 AM 1038 AM   Total Treatment Time 36   Total Timed Codes 30   1:1 Treatment Time 25      [facility] Totals Reminder:  bill using total billable   min of TIMED therapeutic procedures and modalities.   8-22 min = 1 unit; 23-37 min = 2 units; 38-52 min = 3 units; 53-67 min = 4 units; 68-82 min = 5 units            SUBJECTIVE    Pain Level (0-10 scale): 0    Any medication changes, allergies to medications, adverse drug reactions, diagnosis change, or new procedure performed?: [x] No    [] Yes (see summary sheet for update)  Medications: Verified on Patient Summary List    Subjective functional status/changes:     Patient reports getting out of bed this morning he felt dizzy, but did not lose balance.       OBJECTIVE      Therapeutic Procedures:  Tx Min Billable or 1:1 Min (if diff from Tx Min) Procedure, Rationale, Specifics   30 25 79128 Neuromuscular Re-Education (timed):  improve balance, coordination, kinesthetic sense, posture, core stability and proprioception to improve patient's ability to develop conscious control of individual muscles and awareness of position of extremities in order to progress to PLOF and address remaining functional goals. (see flow sheet as applicable)     Details if applicable:     30 25    Total Total             6       min   11489 Canalith Repositioning Procedure (un-timed):  Rationale: correct positional vertigo, decrease

## 2025-03-25 ENCOUNTER — HOSPITAL ENCOUNTER (OUTPATIENT)
Facility: HOSPITAL | Age: 70
Setting detail: RECURRING SERIES
Discharge: HOME | End: 2025-03-28
Attending: PSYCHIATRY & NEUROLOGY
Payer: MEDICARE

## 2025-03-25 PROCEDURE — 97112 NEUROMUSCULAR REEDUCATION: CPT | Performed by: PHYSICAL THERAPIST

## 2025-03-25 PROCEDURE — 95992 CANALITH REPOSITIONING PROC: CPT | Performed by: PHYSICAL THERAPIST

## 2025-03-25 NOTE — PROGRESS NOTES
PHYSICAL THERAPY - MEDICARE DAILY TREATMENT NOTE (updated 3/23)      Date: 3/25/2025          Patient Name:  David Hull :  1955   Medical   Diagnosis:  Benign paroxysmal positional vertigo, unspecified laterality [H81.10] Treatment Diagnosis:  H81.12  Benign paroxysmal vertigo, left ear and R26.81   Unsteadiness on feet    Referral Source:  Allie Emmanuelle Ledy, * Insurance:   Payor: MEDICARE / Plan: MEDICARE PART A AND B / Product Type: *No Product type* /                     Patient  verified yes     Visit #   Current  / Total 6 MN   Time   In / Out 9:53 AM 1031 AM   Total Treatment Time 38   Total Timed Codes 32   1:1 Treatment Time 32      [facility] Totals Reminder:  bill using total billable   min of TIMED therapeutic procedures and modalities.   8-22 min = 1 unit; 23-37 min = 2 units; 38-52 min = 3 units; 53-67 min = 4 units; 68-82 min = 5 units            SUBJECTIVE    Pain Level (0-10 scale): 0    Any medication changes, allergies to medications, adverse drug reactions, diagnosis change, or new procedure performed?: [x] No    [] Yes (see summary sheet for update)  Medications: Verified on Patient Summary List    Subjective functional status/changes:     Patient reports he had mild dizziness getting out of bed this morning, but not as bad as it has been previously.       OBJECTIVE      Therapeutic Procedures:  Tx Min Billable or 1:1 Min (if diff from Tx Min) Procedure, Rationale, Specifics   32  41136 Neuromuscular Re-Education (timed):  improve balance, coordination, kinesthetic sense, posture, core stability and proprioception to improve patient's ability to develop conscious control of individual muscles and awareness of position of extremities in order to progress to PLOF and address remaining functional goals. (see flow sheet as applicable)     Details if applicable:     32     Total Total             6       min   79711 Canalith Repositioning Procedure (un-timed):  Rationale: correct

## 2025-03-27 ENCOUNTER — HOSPITAL ENCOUNTER (OUTPATIENT)
Facility: HOSPITAL | Age: 70
Setting detail: RECURRING SERIES
Discharge: HOME | End: 2025-03-30
Attending: PSYCHIATRY & NEUROLOGY
Payer: MEDICARE

## 2025-03-27 PROCEDURE — 95992 CANALITH REPOSITIONING PROC: CPT | Performed by: PHYSICAL THERAPIST

## 2025-03-27 PROCEDURE — 97112 NEUROMUSCULAR REEDUCATION: CPT | Performed by: PHYSICAL THERAPIST

## 2025-03-27 NOTE — PROGRESS NOTES
PHYSICAL THERAPY - MEDICARE DAILY TREATMENT NOTE (updated 3/23)      Date: 3/27/2025          Patient Name:  David Hull :  1955   Medical   Diagnosis:  Benign paroxysmal positional vertigo, unspecified laterality [H81.10] Treatment Diagnosis:  H81.12  Benign paroxysmal vertigo, left ear and R26.81   Unsteadiness on feet    Referral Source:  Allie Emmanuelle Ledy, * Insurance:   Payor: MEDICARE / Plan: MEDICARE PART A AND B / Product Type: *No Product type* /                     Patient  verified yes     Visit #   Current  / Total 7 MN   Time   In / Out 955 AM 1026 AM   Total Treatment Time 31   Total Timed Codes 26   1:1 Treatment Time 26      [facility] Totals Reminder:  bill using total billable   min of TIMED therapeutic procedures and modalities.   8-22 min = 1 unit; 23-37 min = 2 units; 38-52 min = 3 units; 53-67 min = 4 units; 68-82 min = 5 units            SUBJECTIVE    Pain Level (0-10 scale): 0    Any medication changes, allergies to medications, adverse drug reactions, diagnosis change, or new procedure performed?: [x] No    [] Yes (see summary sheet for update)  Medications: Verified on Patient Summary List    Subjective functional status/changes:     Patient reports overall dizziness much better.       OBJECTIVE      Therapeutic Procedures:  Tx Min Billable or 1:1 Min (if diff from Tx Min) Procedure, Rationale, Specifics   26  89100 Neuromuscular Re-Education (timed):  improve balance, coordination, kinesthetic sense, posture, core stability and proprioception to improve patient's ability to develop conscious control of individual muscles and awareness of position of extremities in order to progress to PLOF and address remaining functional goals. (see flow sheet as applicable)     Details if applicable:     26     Total Total             5       min   77569 Canalith Repositioning Procedure (un-timed):  Rationale: correct positional vertigo, decrease dizziness, improve balance to improve

## 2025-04-01 ENCOUNTER — APPOINTMENT (OUTPATIENT)
Facility: HOSPITAL | Age: 70
End: 2025-04-01
Attending: PSYCHIATRY & NEUROLOGY
Payer: MEDICARE

## 2025-04-08 ENCOUNTER — HOSPITAL ENCOUNTER (OUTPATIENT)
Facility: HOSPITAL | Age: 70
Setting detail: RECURRING SERIES
Discharge: HOME | End: 2025-04-11
Attending: PSYCHIATRY & NEUROLOGY
Payer: MEDICARE

## 2025-04-08 PROCEDURE — 97112 NEUROMUSCULAR REEDUCATION: CPT | Performed by: PHYSICAL THERAPIST

## 2025-04-08 PROCEDURE — 95992 CANALITH REPOSITIONING PROC: CPT | Performed by: PHYSICAL THERAPIST

## 2025-04-08 NOTE — PROGRESS NOTES
least 3 time/week in neighborhood Met  Patient will make an effort to increase walking speed to improve dynamic balance and gait mechanics.  MET  Long Term Goals: To be accomplished in 24 treatments.  Patient will have negative BPPV testing for all canals.  NOT MET  Patient will be stable for all 4 conditions of modified CTSIB Progressing  Patient will increase FOTO score by at least 15 points to demonstrate decreased disability. Progressing      PLAN  Yes  Continue plan of care  Re-Cert Due: 24 visits  [x]  Upgrade activities as tolerated  []  Discharge due to:  []  Other:      Angelita Aggarwal, PT       4/8/2025       8:51 AM

## 2025-04-10 ENCOUNTER — HOSPITAL ENCOUNTER (OUTPATIENT)
Facility: HOSPITAL | Age: 70
Setting detail: RECURRING SERIES
Discharge: HOME | End: 2025-04-13
Attending: PSYCHIATRY & NEUROLOGY
Payer: MEDICARE

## 2025-04-10 PROCEDURE — 97112 NEUROMUSCULAR REEDUCATION: CPT | Performed by: PHYSICAL THERAPIST

## 2025-04-10 PROCEDURE — 95992 CANALITH REPOSITIONING PROC: CPT | Performed by: PHYSICAL THERAPIST

## 2025-04-10 NOTE — PROGRESS NOTES
PHYSICAL THERAPY - MEDICARE DAILY TREATMENT NOTE(updated 3/23)      Date: 4/10/2025          Patient Name:  David Hull :  1955   Medical   Diagnosis:  Benign paroxysmal positional vertigo, unspecified laterality [H81.10] Treatment Diagnosis:  H81.12  Benign paroxysmal vertigo, left ear and R26.81   Unsteadiness on feet    Referral Source:  Allie Emmanuelle Javoncharlene, * Insurance:   Payor: MEDICARE / Plan: MEDICARE PART A AND B / Product Type: *No Product type* /                     Patient  verified yes     Visit #   Current  / Total 9 MN   Time   In / Out 958 AM 1032 AM   Total Treatment Time 34   Total Timed Codes 29   1:1 Treatment Time 24      [facility] Totals Reminder:  bill using total billable   min of TIMED therapeutic procedures and modalities.   8-22 min = 1 unit; 23-37 min = 2 units; 38-52 min = 3 units; 53-67 min = 4 units; 68-82 min = 5 units            SUBJECTIVE    Pain Level (0-10 scale): 0    Any medication changes, allergies to medications, adverse drug reactions, diagnosis change, or new procedure performed?: [x] No    [] Yes (see summary sheet for update)  Medications: Verified on Patient Summary List    Subjective functional status/changes:     Patient reports that he did yard work yesterday with no problems. Notes that looking down usually makes him dizzy so this is an improvement.       OBJECTIVE      Therapeutic Procedures:  Tx Min Billable or 1:1 Min (if diff from Tx Min) Procedure, Rationale, Specifics   29 24 [id number] Neuromuscular Re-Education (timed):  improve balance, coordination, kinesthetic sense, posture, core stability and proprioception to improve patient's ability to develop conscious control of individual muscles and awareness of position of extremities in order to progress to PLOF and address remaining functional goals. (see flow sheet as applicable)     Details if applicable:     29 24    Total Total             5       min   58633 Canalith Repositioning Procedure

## 2025-04-15 ENCOUNTER — HOSPITAL ENCOUNTER (OUTPATIENT)
Facility: HOSPITAL | Age: 70
Setting detail: RECURRING SERIES
Discharge: HOME | End: 2025-04-18
Attending: PSYCHIATRY & NEUROLOGY
Payer: MEDICARE

## 2025-04-15 PROCEDURE — 95992 CANALITH REPOSITIONING PROC: CPT | Performed by: PHYSICAL THERAPIST

## 2025-04-15 PROCEDURE — 97112 NEUROMUSCULAR REEDUCATION: CPT | Performed by: PHYSICAL THERAPIST

## 2025-04-15 NOTE — PROGRESS NOTES
PHYSICAL THERAPY - MEDICARE DAILY TREATMENT NOTE(updated 3/23)      Date: 4/15/2025          Patient Name:  David Hull :  1955   Medical   Diagnosis:  Benign paroxysmal positional vertigo, unspecified laterality [H81.10] Treatment Diagnosis:  H81.12  Benign paroxysmal vertigo, left ear and R26.81   Unsteadiness on feet    Referral Source:  AllieEmmanuelle Javoncharlene, * Insurance:   Payor: MEDICARE / Plan: MEDICARE PART A AND B / Product Type: *No Product type* /                     Patient  verified yes     Visit #   Current  / Total 10 MN   Time   In / Out 153  PM   Total Treatment Time 41   Total Timed Codes 31   1:1 Treatment Time 31      [facility] Totals Reminder:  bill using total billable   min of TIMED therapeutic procedures and modalities.   8-22 min = 1 unit; 23-37 min = 2 units; 38-52 min = 3 units; 53-67 min = 4 units; 68-82 min = 5 units            SUBJECTIVE    Pain Level (0-10 scale): 0    Any medication changes, allergies to medications, adverse drug reactions, diagnosis change, or new procedure performed?: [x] No    [] Yes (see summary sheet for update)  Medications: Verified on Patient Summary List    Subjective functional status/changes:     Patient reports he went to [facility] following previous visit and had no dizziness.       OBJECTIVE      Therapeutic Procedures:  Tx Min Billable or 1:1 Min (if diff from Tx Min) Procedure, Rationale, Specifics   31  14450 Neuromuscular Re-Education (timed):  improve balance, coordination, kinesthetic sense, posture, core stability and proprioception to improve patient's ability to develop conscious control of individual muscles and awareness of position of extremities in order to progress to PLOF and address remaining functional goals. (see flow sheet as applicable)     Details if applicable:  vestibular rehab   31     Total Total             10       min   66111 Canalith Repositioning Procedure (un-timed):  Rationale: correct positional vertigo,

## 2025-04-17 ENCOUNTER — HOSPITAL ENCOUNTER (OUTPATIENT)
Facility: HOSPITAL | Age: 70
Setting detail: RECURRING SERIES
Discharge: HOME | End: 2025-04-20
Attending: PSYCHIATRY & NEUROLOGY
Payer: MEDICARE

## 2025-04-17 PROCEDURE — 97112 NEUROMUSCULAR REEDUCATION: CPT | Performed by: PHYSICAL THERAPIST

## 2025-04-17 PROCEDURE — 95992 CANALITH REPOSITIONING PROC: CPT | Performed by: PHYSICAL THERAPIST

## 2025-04-17 NOTE — PROGRESS NOTES
PHYSICAL THERAPY - MEDICARE DAILY TREATMENT NOTE(updated 3/23)      Date: 2025          Patient Name:  David Hull :  1955   Medical   Diagnosis:  Benign paroxysmal positional vertigo, unspecified laterality [H81.10] Treatment Diagnosis:  H81.12  Benign paroxysmal vertigo, left ear and R26.81   Unsteadiness on feet    Referral Source:  KelleyEmmanuelle Javoncharlene, * Insurance:   Payor: MEDICARE / Plan: MEDICARE PART A AND B / Product Type: *No Product type* /                     Patient  verified yes     Visit #   Current  / Total 11 MN   Time   In / Out 1005 AM 1044 AM   Total Treatment Time 39   Total Timed Codes 30   1:1 Treatment Time 30      [facility] Totals Reminder:  bill using total billable   min of TIMED therapeutic procedures and modalities.   8-22 min = 1 unit; 23-37 min = 2 units; 38-52 min = 3 units; 53-67 min = 4 units; 68-82 min = 5 units            SUBJECTIVE    Pain Level (0-10 scale): 0    Any medication changes, allergies to medications, adverse drug reactions, diagnosis change, or new procedure performed?: [x] No    [] Yes (see summary sheet for update)  Medications: Verified on Patient Summary List    Subjective functional status/changes:     Patient reports that he has not been dizzy at all since last treatment. He notes he feels he is good to continue exercises on own.       OBJECTIVE      Therapeutic Procedures:  Tx Min Billable or 1:1 Min (if diff from Tx Min) Procedure, Rationale, Specifics   30  80605 Neuromuscular Re-Education (timed):  improve balance, coordination, kinesthetic sense, posture, core stability and proprioception to improve patient's ability to develop conscious control of individual muscles and awareness of position of extremities in order to progress to PLOF and address remaining functional goals. (see flow sheet as applicable)     Details if applicable:  vestibular rehab   30     Total Total             9       min   31093 Canalith Repositioning Procedure

## 2025-04-22 ENCOUNTER — APPOINTMENT (OUTPATIENT)
Facility: HOSPITAL | Age: 70
End: 2025-04-22
Attending: PSYCHIATRY & NEUROLOGY
Payer: MEDICARE

## 2025-04-24 ENCOUNTER — APPOINTMENT (OUTPATIENT)
Facility: HOSPITAL | Age: 70
End: 2025-04-24
Attending: PSYCHIATRY & NEUROLOGY
Payer: MEDICARE

## 2025-08-06 DIAGNOSIS — G25.0 BENIGN ESSENTIAL TREMOR: Primary | ICD-10-CM

## 2025-08-06 RX ORDER — PROPRANOLOL HYDROCHLORIDE 60 MG/1
CAPSULE, EXTENDED RELEASE ORAL DAILY
Qty: 30 CAPSULE | Refills: 0 | Status: SHIPPED | OUTPATIENT
Start: 2025-08-06

## 2025-08-29 ENCOUNTER — OFFICE VISIT (OUTPATIENT)
Age: 70
End: 2025-08-29
Payer: MEDICARE

## 2025-08-29 VITALS
WEIGHT: 195 LBS | BODY MASS INDEX: 30.61 KG/M2 | HEART RATE: 63 BPM | SYSTOLIC BLOOD PRESSURE: 122 MMHG | OXYGEN SATURATION: 98 % | DIASTOLIC BLOOD PRESSURE: 60 MMHG | HEIGHT: 67 IN

## 2025-08-29 DIAGNOSIS — H81.10 BENIGN PAROXYSMAL POSITIONAL VERTIGO, UNSPECIFIED LATERALITY: ICD-10-CM

## 2025-08-29 DIAGNOSIS — G25.0 BENIGN ESSENTIAL TREMOR: Primary | ICD-10-CM

## 2025-08-29 PROCEDURE — 99214 OFFICE O/P EST MOD 30 MIN: CPT | Performed by: PSYCHIATRY & NEUROLOGY

## 2025-08-29 PROCEDURE — 1125F AMNT PAIN NOTED PAIN PRSNT: CPT | Performed by: PSYCHIATRY & NEUROLOGY

## 2025-08-29 PROCEDURE — 1159F MED LIST DOCD IN RCRD: CPT | Performed by: PSYCHIATRY & NEUROLOGY

## 2025-08-29 PROCEDURE — G8417 CALC BMI ABV UP PARAM F/U: HCPCS | Performed by: PSYCHIATRY & NEUROLOGY

## 2025-08-29 PROCEDURE — 4004F PT TOBACCO SCREEN RCVD TLK: CPT | Performed by: PSYCHIATRY & NEUROLOGY

## 2025-08-29 PROCEDURE — 3078F DIAST BP <80 MM HG: CPT | Performed by: PSYCHIATRY & NEUROLOGY

## 2025-08-29 PROCEDURE — 3017F COLORECTAL CA SCREEN DOC REV: CPT | Performed by: PSYCHIATRY & NEUROLOGY

## 2025-08-29 PROCEDURE — G8427 DOCREV CUR MEDS BY ELIG CLIN: HCPCS | Performed by: PSYCHIATRY & NEUROLOGY

## 2025-08-29 PROCEDURE — 1123F ACP DISCUSS/DSCN MKR DOCD: CPT | Performed by: PSYCHIATRY & NEUROLOGY

## 2025-08-29 PROCEDURE — 3074F SYST BP LT 130 MM HG: CPT | Performed by: PSYCHIATRY & NEUROLOGY

## 2025-08-29 RX ORDER — M-VIT,TX,IRON,MINS/CALC/FOLIC 27MG-0.4MG
1 TABLET ORAL DAILY
COMMUNITY

## 2025-08-29 RX ORDER — PROPRANOLOL HYDROCHLORIDE 60 MG/1
60 CAPSULE, EXTENDED RELEASE ORAL DAILY
Qty: 90 CAPSULE | Refills: 3 | Status: SHIPPED | OUTPATIENT
Start: 2025-08-29

## 2025-08-29 RX ORDER — TOLTERODINE 4 MG/1
4 CAPSULE, EXTENDED RELEASE ORAL DAILY
COMMUNITY

## 2025-08-29 RX ORDER — MULTIVIT-MIN/IRON/FOLIC ACID/K 18-600-40
2000 CAPSULE ORAL DAILY
COMMUNITY

## (undated) DEVICE — CANN NASAL O2 CAPNOGRAPHY AD -- FILTERLINE

## (undated) DEVICE — BAG BELONG PT PERS CLEAR HANDL

## (undated) DEVICE — (D)SENSOR RMFG 02 PULS OXMTR -- DISC BY MFR USE ITEM 133445

## (undated) DEVICE — SIMPLICITY FLUFF UNDERPAD 23X36, MODERATE: Brand: SIMPLICITY

## (undated) DEVICE — BAG SPEC BIOHZRD 10 X 10 IN --

## (undated) DEVICE — KIT COLON W/ 1.1OZ LUB AND 2 END

## (undated) DEVICE — SOLIDIFIER MEDC 1200ML -- CONVERT TO 356117

## (undated) DEVICE — 1200 GUARD II KIT W/5MM TUBE W/O VAC TUBE: Brand: GUARDIAN

## (undated) DEVICE — ELECTRODE,RADIOTRANSLUCENT,FOAM,3PK: Brand: MEDLINE

## (undated) DEVICE — POLYP TRAP: Brand: TRAPEASE®

## (undated) DEVICE — CONTAINER SPEC 20 ML LID NEUT BUFF FORMALIN 10 % POLYPR STS

## (undated) DEVICE — SNARE ENDOSCP M L240CM W27MM SHTH DIA2.4MM CHN 2.8MM OVL

## (undated) DEVICE — SET GRAV CK VLV NEEDLESS ST 3 GANGED 4WAY STPCOCK HI FLO 10

## (undated) DEVICE — 3M™ CUROS™ DISINFECTING CAP FOR NEEDLELESS CONNECTORS 270/CARTON 20 CARTONS/CASE CFF1-270: Brand: CUROS™